# Patient Record
Sex: MALE | Race: OTHER | Employment: UNEMPLOYED | URBAN - METROPOLITAN AREA
[De-identification: names, ages, dates, MRNs, and addresses within clinical notes are randomized per-mention and may not be internally consistent; named-entity substitution may affect disease eponyms.]

---

## 2017-03-21 ENCOUNTER — OFFICE VISIT (OUTPATIENT)
Dept: FAMILY MEDICINE CLINIC | Age: 37
End: 2017-03-21

## 2017-03-21 VITALS
SYSTOLIC BLOOD PRESSURE: 128 MMHG | HEIGHT: 74 IN | BODY MASS INDEX: 34.01 KG/M2 | HEART RATE: 90 BPM | TEMPERATURE: 97.2 F | DIASTOLIC BLOOD PRESSURE: 69 MMHG | RESPIRATION RATE: 18 BRPM | WEIGHT: 265 LBS

## 2017-03-21 DIAGNOSIS — G56.03 BILATERAL CARPAL TUNNEL SYNDROME: Primary | ICD-10-CM

## 2017-03-21 DIAGNOSIS — Z51.81 MEDICATION MONITORING ENCOUNTER: ICD-10-CM

## 2017-03-21 DIAGNOSIS — M79.641 PAIN OF RIGHT HAND: ICD-10-CM

## 2017-03-21 LAB
ERYTHROCYTE [DISTWIDTH] IN BLOOD BY AUTOMATED COUNT: 13.6 % (ref 10–16)
HCT VFR BLD AUTO: 48.7 % (ref 36.6–51.9)
HGB BLD-MCNC: 16.6 G/DL (ref 13.2–17.3)
MCH RBC QN AUTO: 32 PG (ref 26–34)
MCHC RBC AUTO-ENTMCNC: 34 G/DL (ref 32–36)
MCV RBC AUTO: 93 FL (ref 80–95)
PLATELET # BLD AUTO: 292 K/UL (ref 140–440)
PMV BLD AUTO: 9.7 FL (ref 6–10.8)
RBC # BLD AUTO: 5.26 M/UL (ref 3.8–5.8)
WBC # BLD AUTO: 8.2 K/UL (ref 4–11)

## 2017-03-21 NOTE — PROGRESS NOTES
Ann Marie Bermudez is a 44 y.o. male and presents with Hand Pain (Bilateral ); Hand Swelling (wrist); and Numbness (hand)       Subjective:    Bilateral carpal tunnel syndrome- continues to be a problem for him. Difficulty holding tools. Still very busy at work on floors and on his cars. Every morning wakes with hand feeling numb. Both hands. Using motrin this week- no change. Used it in the past.   Also pain at base of right 1st mcp joint    Assessment/Plan:      Bilateral carpal tunnel based on sx. Neg tinnel but positive phalen - referral to hand surgery. Resume using wrist brace at night until complete resolution discussed. Continue motrin- check labs. Right hand pain- check xray to r/o DJD  [place]- on some nails- normal hgb 7/2016 so unlikely iron deficiency- will recheck CBC. The patient was advised that NSAID-type medications have two very important potential side effects: gastrointestinal irritation including hemorrhage and renal injuries. He was asked to take the medication with food and to stop if he experiences any GI upset. I asked him to call for vomiting, abdominal pain or black/bloody stools. The patient expresses understanding of these issues and questions were answered. RTC in 4 weeks. Orders Placed This Encounter    XR HAND RT MIN 3 V     Standing Status:   Future     Number of Occurrences:   1     Standing Expiration Date:   4/21/2018     Order Specific Question:   Reason for Exam     Answer:   pain at 1st MCP     Order Specific Question:   Is Patient Allergic to Contrast Dye? Answer:   No    METABOLIC PANEL, BASIC     Standing Status:   Future     Standing Expiration Date:   3/22/2018    CBC W/O DIFF     Standing Status:   Future     Standing Expiration Date:   3/22/2018    ibuprofen 100 mg tablet     Sig: Take 100 mg by mouth every six (6) hours as needed for Pain.            ROS:  Negative except as mentioned above  Cardiac- no chest pain or palpitations  Pulmonary- no sob or wheezes  GI- no n/v or diarrhea. SH:  Social History   Substance Use Topics    Smoking status: Current Every Day Smoker    Smokeless tobacco: None    Alcohol use 0.0 oz/week     0 Standard drinks or equivalent per week      Comment: marijuana         Medications/Allergies:  No current outpatient prescriptions on file prior to visit. No current facility-administered medications on file prior to visit. No Known Allergies    Objective:  Visit Vitals    /69    Pulse 90    Temp 97.2 °F (36.2 °C) (Oral)    Resp 18    Ht 6' 2\" (1.88 m)    Wt 265 lb (120.2 kg)    BMI 34.02 kg/m2    Body mass index is 34.02 kg/(m^2). Constitutional: Well developed, nourished, no distress, alert   Hands- Bilateral small trauma- shallow lac/abrasion. + phalen sign but neg tinnel's. Xray reviewed- possible mild narrowing of 1st MCP.

## 2017-03-21 NOTE — PROGRESS NOTES
1. Have you been to the ER, urgent care clinic since your last visit? Hospitalized since your last visit? No.     2. Have you seen or consulted any other health care providers outside of the [facility] since your last visit? Include any pap smears or colon screening. No.    Patient presents with bilateral hand pain, swelling and weakness/numbness. And a shooting left hand radiates up to his left shoulder.

## 2017-03-21 NOTE — PATIENT INSTRUCTIONS
Carpal Tunnel Syndrome: Exercises  Your Care Instructions  Here are some examples of typical rehabilitation exercises for your condition. Start each exercise slowly. Ease off the exercise if you start to have pain. Your doctor or your physical or occupational therapist will tell you when you can start these exercises and which ones will work best for you. How to do the exercises  Note: When you no longer have pain or numbness, you can do exercises to help prevent carpal tunnel syndrome from coming back. Do not do any stretch or movement that is uncomfortable or painful. Warm-up stretches  1. Rotate your wrist up, down, and from side to side. Repeat 4 times. 2. Stretch your fingers far apart. Relax them, and then stretch them again. Repeat 4 times. 3. Stretch your thumb by pulling it back gently, holding it, and then releasing it. Repeat 4 times. Prayer stretch    1. Start with your palms together in front of your chest just below your chin. 2. Slowly lower your hands toward your waistline, keeping your hands close to your stomach and your palms together until you feel a mild to moderate stretch under your forearms. 3. Hold for at least 15 to 30 seconds. Repeat 2 to 4 times. Wrist flexor stretch    1. Extend your arm in front of you with your palm up. 2. Bend your wrist, pointing your hand toward the floor. 3. With your other hand, gently bend your wrist farther until you feel a mild to moderate stretch in your forearm. 4. Hold for at least 15 to 30 seconds. Repeat 2 to 4 times. Wrist extensor stretch    Repeat steps 1 through 4 of the stretch above, but begin with your extended hand palm down. Follow-up care is a key part of your treatment and safety. Be sure to make and go to all appointments, and call your doctor if you are having problems. It's also a good idea to know your test results and keep a list of the medicines you take. Where can you learn more?   Go to http://seema-jaymie.info/. Enter G141 in the search box to learn more about \"Carpal Tunnel Syndrome: Exercises. \"  Current as of: May 23, 2016  Content Version: 11.1  © 4467-4608 [employer]. Care instructions adapted under license by [employer] (which disclaims liability or warranty for this information). If you have questions about a medical condition or this instruction, always ask your healthcare professional. [place] [address] any warranty or liability for your use of this information. Carpal Tunnel Syndrome: Care Instructions  Your Care Instructions    Carpal tunnel syndrome is a nerve problem. It can cause tingling, numbness, weakness, or pain in the fingers, thumb, and hand. The median nerve and several tough tissues called tendons run through a space in the wrist called the carpal tunnel. The repeated hand motions used in work and some hobbies and sports can put pressure on the nerve. Pregnancy and several conditions, including diabetes, arthritis, and an underactive thyroid, also can cause carpal tunnel syndrome. You may be able to limit an activity or do it differently to reduce your symptoms. You also can take other steps to feel better. If your symptoms are mild, 1 to 2 weeks of home treatment are likely to ease your pain. Surgery is needed only if other treatments do not work. Follow-up care is a key part of your treatment and safety. Be sure to make and go to all appointments, and call your doctor if you are having problems. It's also a good idea to know your test results and keep a list of the medicines you take. How can you care for yourself at home? · If possible, stop or reduce the activity that causes your symptoms. If you cannot stop the activity, take frequent breaks to rest and stretch or change hand positions to do a task. Try switching hands, such as when using a computer mouse.   · Try to avoid bending or twisting your wrists. · Ask your doctor if you can take an over-the-counter pain medicine, such as acetaminophen (Tylenol), ibuprofen (Advil, Motrin), or naproxen (Aleve). Be safe with medicines. Read and follow all instructions on the label. · If your doctor prescribes corticosteroid medicine to help reduce pain and swelling, take it exactly as prescribed. Call your doctor if you think you are having a problem with your medicine. · Put ice or a cold pack on your wrist for 10 to 20 minutes at a time to ease pain. Put a thin cloth between the ice and your skin. · If your doctor or your physical or occupational therapist tells you to wear a wrist splint, wear it as directed to keep your wrist in a neutral position. This also eases pressure on your median nerve. · Ask your doctor whether you should have physical or occupational therapy to learn how to do tasks differently. · Try a yoga class to stretch your muscles and build strength in your hands and wrists. Yoga has been shown to ease carpal tunnel symptoms. To prevent carpal tunnel  · When working at a computer, keep your hands and wrists in line with your forearms. Hold your elbows close to your sides. Take a break every 10 to 15 minutes. · Try these exercises:  ¨ Warm up: Rotate your wrist up, down, and from side to side. Repeat this 4 times. Stretch your fingers far apart, relax them, then stretch them again. Repeat 4 times. Stretch your thumb by pulling it back gently, holding it, and then releasing it. Repeat 4 times. ¨ Prayer stretch: Start with your palms together in front of your chest just below your chin. Slowly lower your hands toward your waistline while keeping your hands close to your stomach and your palms together until you feel a mild to moderate stretch under your forearms. Hold for 10 to 20 seconds. Repeat 4 times. ¨ Wrist flexor stretch: Hold your arm in front of you with your palm up. Bend your wrist, pointing your hand toward the floor.  With your other hand, gently bend your wrist further until you feel a mild to moderate stretch in your forearm. Hold for 10 to 20 seconds. Repeat 4 times. ¨ Wrist extensor stretch: Repeat the steps for the wrist flexor stretch, but begin with your extended hand palm down. · Squeeze a rubber exercise ball several times a day to keep your hands and fingers strong. · Avoid holding objects (such as a book) in one position for a long time. When possible, use your whole hand to grasp an object. Using just the thumb and index finger can put stress on the wrist.  · Do not smoke. It can make this condition worse by reducing blood flow to the median nerve. If you need help quitting, talk to your doctor about stop-smoking programs and medicines. These can increase your chances of quitting for good. When should you call for help? Watch closely for changes in your health, and be sure to contact your doctor if:  · Your pain or other problems do not get better with home care. · You want more information about physical or occupational therapy. · You have side effects of your corticosteroid medicine, such as:  ¨ Weight gain. ¨ Mood changes. ¨ Trouble sleeping. ¨ Bruising easily. · You have any other problems with your medicine. Where can you learn more? Go to http://seema-jaymie.info/. Enter R432 in the search box to learn more about \"Carpal Tunnel Syndrome: Care Instructions. \"  Current as of: May 23, 2016  Content Version: 11.1  © 6887-6924 [employer]. Care instructions adapted under license by [employer] (which disclaims liability or warranty for this information). If you have questions about a medical condition or this instruction, always ask your healthcare professional. [place] [address] any warranty or liability for your use of this information.

## 2017-03-21 NOTE — MR AVS SNAPSHOT
Visit Information Date & Time Provider Department Dept. Phone Encounter #  
 3/21/2017  9:00 AM Emi De Los Santos, [address] [place] [address] [phone] [id number] Follow-up Instructions Return in about 4 weeks (around 4/18/2017). Upcoming Health Maintenance Date Due Pneumococcal 19-64 Medium Risk (1 of 1 - PPSV23) 2/21/1998 DTaP/Tdap/Td series (1 - Tdap) 2/21/2000 INFLUENZA AGE 9 TO ADULT 8/1/2016 Allergies as of 3/21/2017  Review Complete On: 3/21/2017 By: Emi De Los Santos MD  
 No Known Allergies Current Immunizations  Never Reviewed No immunizations on file. Not reviewed this visit You Were Diagnosed With   
  
 Codes Comments Bilateral carpal tunnel syndrome    -  Primary ICD-10-CM: G56.03 
ICD-9-CM: 354.0 Pain of right hand     ICD-10-CM: M79.641 ICD-9-CM: 729.5 1st mcp Medication monitoring encounter     ICD-10-CM: Z51.81 
ICD-9-CM: V58.83 Vitals BP Pulse Temp Resp Height(growth percentile) Weight(growth percentile) 128/69 90 97.2 °F (36.2 °C) (Oral) 18 6' 2\" (1.88 m) 265 lb (120.2 kg) BMI Smoking Status 34.02 kg/m2 Current Every Day Smoker BMI and BSA Data Body Mass Index Body Surface Area 34.02 kg/m 2 2.51 m 2 Preferred Pharmacy Pharmacy Name Phone [address] [phone] Your Updated Medication List  
  
   
This list is accurate as of: 3/21/17  9:55 AM.  Always use your most recent med list.  
  
  
  
  
 ibuprofen 100 mg tablet Take 100 mg by mouth every six (6) hours as needed for Pain. We Performed the Following REFERRAL TO HAND SURGERY [REF31 Custom] Comments:  
 Please evaluate patient for CTS Follow-up Instructions Return in about 4 weeks (around 4/18/2017). To-Do List   
 03/21/2017 Lab:  CBC W/O DIFF   
  
 03/21/2017 Lab: METABOLIC PANEL, BASIC   
  
 03/21/2017 Imaging:  XR HAND RT MIN 3 V Referral Information Referral ID Referred By Referred To  
  
 [id number] BILLY Montemayor Not Available Visits Status Start Date End Date 1 New Request 3/21/17 3/21/18 If your referral has a status of pending review or denied, additional information will be sent to support the outcome of this decision. Patient Instructions Carpal Tunnel Syndrome: Exercises Your Care Instructions Here are some examples of typical rehabilitation exercises for your condition. Start each exercise slowly. Ease off the exercise if you start to have pain. Your doctor or your physical or occupational therapist will tell you when you can start these exercises and which ones will work best for you. How to do the exercises Note: When you no longer have pain or numbness, you can do exercises to help prevent carpal tunnel syndrome from coming back. Do not do any stretch or movement that is uncomfortable or painful. Warm-up stretches 1. Rotate your wrist up, down, and from side to side. Repeat 4 times. 2. Stretch your fingers far apart. Relax them, and then stretch them again. Repeat 4 times. 3. Stretch your thumb by pulling it back gently, holding it, and then releasing it. Repeat 4 times. Prayer stretch 1. Start with your palms together in front of your chest just below your chin. 2. Slowly lower your hands toward your waistline, keeping your hands close to your stomach and your palms together until you feel a mild to moderate stretch under your forearms. 3. Hold for at least 15 to 30 seconds. Repeat 2 to 4 times. Wrist flexor stretch 1. Extend your arm in front of you with your palm up. 2. Bend your wrist, pointing your hand toward the floor. 3. With your other hand, gently bend your wrist farther until you feel a mild to moderate stretch in your forearm. 4. Hold for at least 15 to 30 seconds. Repeat 2 to 4 times. Wrist extensor stretch Repeat steps 1 through 4 of the stretch above, but begin with your extended hand palm down. Follow-up care is a key part of your treatment and safety. Be sure to make and go to all appointments, and call your doctor if you are having problems. It's also a good idea to know your test results and keep a list of the medicines you take. Where can you learn more? Go to http://seema-jaymie.info/. Enter O568 in the search box to learn more about \"Carpal Tunnel Syndrome: Exercises. \" Current as of: May 23, 2016 Content Version: 11.1 © 3359-1800 [employer]unch[employer]. Care instructions adapted under license by [employer] (which disclaims liability or warranty for this information). If you have questions about a medical condition or this instruction, always ask your healthcare professional. [place] [address] any warranty or liability for your use of this information. Carpal Tunnel Syndrome: Care Instructions Your Care Instructions Carpal tunnel syndrome is a nerve problem. It can cause tingling, numbness, weakness, or pain in the fingers, thumb, and hand. The median nerve and several tough tissues called tendons run through a space in the wrist called the carpal tunnel. The repeated hand motions used in work and some hobbies and sports can put pressure on the nerve. Pregnancy and several conditions, including diabetes, arthritis, and an underactive thyroid, also can cause carpal tunnel syndrome. You may be able to limit an activity or do it differently to reduce your symptoms. You also can take other steps to feel better. If your symptoms are mild, 1 to 2 weeks of home treatment are likely to ease your pain. Surgery is needed only if other treatments do not work. Follow-up care is a key part of your treatment and safety. Be sure to make and go to all appointments, and call your doctor if you are having problems. It's also a good idea to know your test results and keep a list of the medicines you take. How can you care for yourself at home? · If possible, stop or reduce the activity that causes your symptoms. If you cannot stop the activity, take frequent breaks to rest and stretch or change hand positions to do a task. Try switching hands, such as when using a computer mouse. · Try to avoid bending or twisting your wrists. · Ask your doctor if you can take an over-the-counter pain medicine, such as acetaminophen (Tylenol), ibuprofen (Advil, Motrin), or naproxen (Aleve). Be safe with medicines. Read and follow all instructions on the label. · If your doctor prescribes corticosteroid medicine to help reduce pain and swelling, take it exactly as prescribed. Call your doctor if you think you are having a problem with your medicine. · Put ice or a cold pack on your wrist for 10 to 20 minutes at a time to ease pain. Put a thin cloth between the ice and your skin. · If your doctor or your physical or occupational therapist tells you to wear a wrist splint, wear it as directed to keep your wrist in a neutral position. This also eases pressure on your median nerve. · Ask your doctor whether you should have physical or occupational therapy to learn how to do tasks differently. · Try a yoga class to stretch your muscles and build strength in your hands and wrists. Yoga has been shown to ease carpal tunnel symptoms. To prevent carpal tunnel · When working at a computer, keep your hands and wrists in line with your forearms. Hold your elbows close to your sides. Take a break every 10 to 15 minutes. · Try these exercises: ¨ Warm up: Rotate your wrist up, down, and from side to side. Repeat this 4 times. Stretch your fingers far apart, relax them, then stretch them again. Repeat 4 times. Stretch your thumb by pulling it back gently, holding it, and then releasing it. Repeat 4 times. ¨ Prayer stretch: Start with your palms together in front of your chest just below your chin. Slowly lower your hands toward your waistline while keeping your hands close to your stomach and your palms together until you feel a mild to moderate stretch under your forearms. Hold for 10 to 20 seconds. Repeat 4 times. ¨ Wrist flexor stretch: Hold your arm in front of you with your palm up. Bend your wrist, pointing your hand toward the floor. With your other hand, gently bend your wrist further until you feel a mild to moderate stretch in your forearm. Hold for 10 to 20 seconds. Repeat 4 times. ¨ Wrist extensor stretch: Repeat the steps for the wrist flexor stretch, but begin with your extended hand palm down. · Squeeze a rubber exercise ball several times a day to keep your hands and fingers strong. · Avoid holding objects (such as a book) in one position for a long time. When possible, use your whole hand to grasp an object. Using just the thumb and index finger can put stress on the wrist. 
· Do not smoke. It can make this condition worse by reducing blood flow to the median nerve. If you need help quitting, talk to your doctor about stop-smoking programs and medicines. These can increase your chances of quitting for good. When should you call for help? Watch closely for changes in your health, and be sure to contact your doctor if: 
· Your pain or other problems do not get better with home care. · You want more information about physical or occupational therapy. · You have side effects of your corticosteroid medicine, such as: 
¨ Weight gain. ¨ Mood changes. ¨ Trouble sleeping. ¨ Bruising easily. · You have any other problems with your medicine. Where can you learn more? Go to http://seema-jaymie.info/. Enter R432 in the search box to learn more about \"Carpal Tunnel Syndrome: Care Instructions. \" Current as of: May 23, 2016 Content Version: 11.1 © 5320-5987 [employer] [facility]. Care instructions adapted under license by [employer] (which disclaims liability or warranty for this information). If you have questions about a medical condition or this instruction, always ask your healthcare professional. [address] any warranty or liability for your use of this information. Introducing [facility]! Mona Singletary introduces Superbac patient portal. Now you can access parts of your medical record, email your doctor's office, and request medication refills online. 1. In your internet browser, go to [email or web address] Internet. Stand Offer/AT Internet 2. Click on the First Time User? Click Here link in the Sign In box. You will see the New Member Sign Up page. 3. Enter your Superbac Access Code exactly as it appears below. You will not need to use this code after youve completed the sign-up process. If you do not sign up before the expiration date, you must request a new code. · Superbac Access Code: [email or web address] Expires: 6/19/2017  9:55 AM 
 
4. Enter the last four digits of your Social Security Number (xxxx) and Date of Birth (mm/dd/yyyy) as indicated and click Submit. You will be taken to the next sign-up page. 5. Create a ActionRunt ID. This will be your Superbac login ID and cannot be changed, so think of one that is secure and easy to remember. 6. Create a Superbac password. You can change your password at any time. 7. Enter your Password Reset Question and Answer. This can be used at a later time if you forget your password. 8. Enter your e-mail address. You will receive e-mail notification when new information is available in [address] 9. Click Sign Up. You can now view and download portions of your medical record. 10. Click the Download Summary menu link to download a portable copy of your medical information. If you have questions, please visit the Frequently Asked Questions section of the Motion Math website. Remember, Motion Math is NOT to be used for urgent needs. For medical emergencies, dial 911. Now available from your iPhone and Android! Please provide this summary of care documentation to your next provider. Your primary care clinician is listed as MANE Soliman. If you have any questions after today's visit, please call [phone].

## 2017-03-22 LAB
ANION GAP SERPL CALC-SCNC: 19 MMOL/L
BUN SERPL-MCNC: 14 MG/DL (ref 6–22)
CALCIUM SERPL-MCNC: 9.8 MG/DL (ref 8.4–10.4)
CHLORIDE SERPL-SCNC: 99 MMOL/L (ref 98–110)
CO2 SERPL-SCNC: 25 MMOL/L (ref 20–32)
CREAT SERPL-MCNC: 0.9 MG/DL (ref 0.5–1.2)
GFRAA, 66117: >60
GFRNA, 66118: >60
GLUCOSE SERPL-MCNC: 132 MG/DL (ref 65–99)
POTASSIUM SERPL-SCNC: 4.3 MMOL/L (ref 3.5–5.5)
SODIUM SERPL-SCNC: 143 MMOL/L (ref 133–145)

## 2017-05-16 ENCOUNTER — OFFICE VISIT (OUTPATIENT)
Dept: FAMILY MEDICINE CLINIC | Age: 37
End: 2017-05-16

## 2017-05-16 VITALS
HEART RATE: 96 BPM | OXYGEN SATURATION: 96 % | HEIGHT: 74 IN | TEMPERATURE: 98.4 F | DIASTOLIC BLOOD PRESSURE: 51 MMHG | RESPIRATION RATE: 18 BRPM | SYSTOLIC BLOOD PRESSURE: 144 MMHG | BODY MASS INDEX: 33.62 KG/M2 | WEIGHT: 262 LBS

## 2017-05-16 DIAGNOSIS — H92.01 EAR PAIN, RIGHT: Primary | ICD-10-CM

## 2017-05-16 NOTE — PROGRESS NOTES
1. Have you been to the ER, urgent care clinic since your last visit? Hospitalized since your last visit? No    2. Have you seen or consulted any other health care providers outside of the [address] [place] [address] since your last visit? Include any pap smears or colon screening.  No

## 2017-05-16 NOTE — PATIENT INSTRUCTIONS
Earache: Care Instructions  Your Care Instructions  Even though infection is a common cause of ear pain, not all ear pain means an infection. If you have ear pain and don't have an infection, it could be because of a jaw problem, such as temporomandibular joint (TMJ) pain. Or it could be because of a neck problem. When ear discomfort or pain is mild or comes and goes without other symptoms, home treatment may be all you need. Follow-up care is a key part of your treatment and safety. Be sure to make and go to all appointments, and call your doctor if you are having problems. It's also a good idea to know your test results and keep a list of the medicines you take. How can you care for yourself at home? · Apply heat on the ear to ease pain. To apply heat, put a warm water bottle, a heating pad set on low, or a warm cloth on your ear. Do not go to sleep with a heating pad on your skin. · Take an over-the-counter pain medicine, such as acetaminophen (Tylenol), ibuprofen (Advil, Motrin), or naproxen (Aleve). Be safe with medicines. Read and follow all instructions on the label. · Do not take two or more pain medicines at the same time unless the doctor told you to. Many pain medicines have acetaminophen, which is Tylenol. Too much acetaminophen (Tylenol) can be harmful. · Never insert anything, such as a cotton swab or a trev pin, into the ear. When should you call for help? Call your doctor now or seek immediate medical care if:  · You have a fever. · You feel a lump in your neck or jaw. · The area around the ear starts to swell. · There is pus or blood draining from the ear. · There is new or different drainage from the ear. Watch closely for changes in your health, and be sure to contact your doctor if:  · Your pain gets worse. · You do not get better as expected. Where can you learn more? Go to http://seema-jaymie.info/.   Enter O970 in the search box to learn more about \"Earache: Care Instructions. \"  Current as of: July 29, 2016  Content Version: 11.2  © 8494-5401 [employer], [employer]. Care instructions adapted under license by [employer] (which disclaims liability or warranty for this information). If you have questions about a medical condition or this instruction, always ask your healthcare professional. [place] [address] any warranty or liability for your use of this information.

## 2017-05-16 NOTE — PROGRESS NOTES
Chief Complaint   Patient presents with    Ear Pain     HPI:    This is a 43 y/o male who presents with the above symptom. Symptoms started with cough and sore throat that have since resolved. Today he complain of right ear pain. Patient denies SOB, CP, dizziness, headache. ROS: pertinent positives as noted in HPI. All others were negative      No past medical history on file. Social History     Social History    Marital status:      Spouse name: N/A    Number of children: N/A    Years of education: N/A     Occupational History    Not on file. Social History Main Topics    Smoking status: Current Every Day Smoker    Smokeless tobacco: Not on file      Comment: smokes e cig     Alcohol use No    Drug use: No    Sexual activity: Yes     Partners: Female     Birth control/ protection: None     Other Topics Concern    Not on file     Social History Narrative     Current Outpatient Prescriptions   Medication Sig Dispense Refill    ibuprofen 100 mg tablet Take 100 mg by mouth every six (6) hours as needed for Pain. No Known Allergies    Physical Exam:    Vital Signs:     Visit Vitals    /51    Pulse 96    Temp 98.4 °F (36.9 °C) (Oral)    Resp 18    Ht 6' 2\" (1.88 m)    Wt 262 lb (118.8 kg)    SpO2 96%    BMI 33.64 kg/m2         General: a, a & o x 3, afebrile,  interacting appropriately, in no acute distress  HEENT: head NC/AT, conjuctiva clear, nose clear, ear canals clear, TM intact  and clear. No erythema   Respiratory: lung sounds clear bilaterally,  no wheezes or crackles  Cardiovascular: normal S1S2, regular rate and rhythm, no murmurs      Assessment/Plan:      ICD-10-CM ICD-9-CM    1. Ear pain, right H92.01 388.70 Patient advised to take Tylenol for pain  Patient to apply warm compresses for comfort           Additional Notes: Discussed today's diagnosis, treatment plans. Discussed medication indications and side effects.     After Visit Summary: Provided and discussed printed patient instructions. Answered questions in relation to today's diagnosis.   Follow-up Disposition: taran Schulz NP-BC  [address] [facility],[address]

## 2017-05-16 NOTE — MR AVS SNAPSHOT
Visit Information Date & Time Provider Department Dept. Phone Encounter #  
 5/16/2017  2:30 PM Allegra San NP Jeremy 13 [id number] Follow-up Instructions Return if symptoms worsen or fail to improve. Your Appointments 5/23/2017  8:30 AM  
PHYSICAL with MD Comfort Herrera 23 [facility]) Appt Note: CPE  
 [address]  
  
   
 [address] Upcoming Health Maintenance Date Due Pneumococcal 19-64 Medium Risk (1 of 1 - PPSV23) 2/21/1998 DTaP/Tdap/Td series (1 - Tdap) 2/21/2000 INFLUENZA AGE 9 TO ADULT 8/1/2017 Allergies as of 5/16/2017  Review Complete On: 5/16/2017 By: Thuy Banks LPN No Known Allergies Current Immunizations  Never Reviewed No immunizations on file. Not reviewed this visit You Were Diagnosed With   
  
 Codes Comments Ear pain, right    -  Primary ICD-10-CM: H92.01 
ICD-9-CM: 388.70 Vitals BP Pulse Temp Resp Height(growth percentile) Weight(growth percentile) 144/51 96 98.4 °F (36.9 °C) (Oral) 18 6' 2\" (1.88 m) 262 lb (118.8 kg) SpO2 BMI Smoking Status 96% 33.64 kg/m2 Current Every Day Smoker BMI and BSA Data Body Mass Index Body Surface Area  
 33.64 kg/m 2 2.49 m 2 Preferred Pharmacy Pharmacy Name Phone [address] [phone] Your Updated Medication List  
  
   
This list is accurate as of: 5/16/17  3:05 PM.  Always use your most recent med list.  
  
  
  
  
 ibuprofen 100 mg tablet Take 100 mg by mouth every six (6) hours as needed for Pain. Follow-up Instructions Return if symptoms worsen or fail to improve. Patient Instructions Earache: Care Instructions Your Care Instructions Even though infection is a common cause of ear pain, not all ear pain means an infection. If you have ear pain and don't have an infection, it could be because of a jaw problem, such as temporomandibular joint (TMJ) pain. Or it could be because of a neck problem. When ear discomfort or pain is mild or comes and goes without other symptoms, home treatment may be all you need. Follow-up care is a key part of your treatment and safety. Be sure to make and go to all appointments, and call your doctor if you are having problems. It's also a good idea to know your test results and keep a list of the medicines you take. How can you care for yourself at home? · Apply heat on the ear to ease pain. To apply heat, put a warm water bottle, a heating pad set on low, or a warm cloth on your ear. Do not go to sleep with a heating pad on your skin. · Take an over-the-counter pain medicine, such as acetaminophen (Tylenol), ibuprofen (Advil, Motrin), or naproxen (Aleve). Be safe with medicines. Read and follow all instructions on the label. · Do not take two or more pain medicines at the same time unless the doctor told you to. Many pain medicines have acetaminophen, which is Tylenol. Too much acetaminophen (Tylenol) can be harmful. · Never insert anything, such as a cotton swab or a trev pin, into the ear. When should you call for help? Call your doctor now or seek immediate medical care if: 
· You have a fever. · You feel a lump in your neck or jaw. · The area around the ear starts to swell. · There is pus or blood draining from the ear. · There is new or different drainage from the ear. Watch closely for changes in your health, and be sure to contact your doctor if: 
· Your pain gets worse. · You do not get better as expected. Where can you learn more? Go to http://seema-jaymie.info/. Enter H434 in the search box to learn more about \"Earache: Care Instructions. \" 
 Current as of: July 29, 2016 Content Version: 11.2 © 6220-6884 [employer]. Care instructions adapted under license by [employer] (which disclaims liability or warranty for this information). If you have questions about a medical condition or this instruction, always ask your healthcare professional. [address] any warranty or liability for your use of this information. Introducing [facility]! Tori Guzman introduces [facility] patient portal. Now you can access parts of your medical record, email your doctor's office, and request medication refills online. 1. In your internet browser, go to [email or web address]/Kickstarter 2. Click on the First Time User? Click Here link in the Sign In box. You will see the New Member Sign Up page. 3. Enter your [facility] Access Code exactly as it appears below. You will not need to use this code after youve completed the sign-up process. If you do not sign up before the expiration date, you must request a new code. · [employer] [facility] Access Code: [email or web address] Expires: 6/19/2017  9:55 AM 
 
4. Enter the last four digits of your Social Security Number (xxxx) and Date of Birth (mm/dd/yyyy) as indicated and click Submit. You will be taken to the next sign-up page. 5. Create a [facility] ID. This will be your [facility] login ID and cannot be changed, so think of one that is secure and easy to remember. 6. Create a [facility] password. You can change your password at any time. 7. Enter your Password Reset Question and Answer. This can be used at a later time if you forget your password. 8. Enter your e-mail address. You will receive e-mail notification when new information is available in [address] 9. Click Sign Up. You can now view and download portions of your medical record. 10. Click the Download Summary menu link to download a portable copy of your medical information. If you have questions, please visit the Frequently Asked Questions section of the [employer] website. Remember, [employer] is NOT to be used for urgent needs. For medical emergencies, dial 911. Now available from your iPhone and Android! Please provide this summary of care documentation to your next provider. Your primary care clinician is listed as MANE Soliman. If you have any questions after today's visit, please call [phone].

## 2017-05-23 ENCOUNTER — OFFICE VISIT (OUTPATIENT)
Dept: FAMILY MEDICINE CLINIC | Age: 37
End: 2017-05-23

## 2017-05-23 VITALS
SYSTOLIC BLOOD PRESSURE: 130 MMHG | BODY MASS INDEX: 34.01 KG/M2 | RESPIRATION RATE: 16 BRPM | DIASTOLIC BLOOD PRESSURE: 60 MMHG | HEIGHT: 74 IN | WEIGHT: 265 LBS | TEMPERATURE: 97.8 F | HEART RATE: 79 BPM

## 2017-05-23 DIAGNOSIS — R73.9 ELEVATED BLOOD SUGAR: ICD-10-CM

## 2017-05-23 DIAGNOSIS — G56.03 BILATERAL CARPAL TUNNEL SYNDROME: Primary | ICD-10-CM

## 2017-05-23 DIAGNOSIS — Z72.0 TOBACCO ABUSE: ICD-10-CM

## 2017-05-23 DIAGNOSIS — R74.01 TRANSAMINITIS: ICD-10-CM

## 2017-05-23 DIAGNOSIS — R60.0 EDEMA OF LEFT LOWER EXTREMITY: ICD-10-CM

## 2017-05-23 LAB
A-G RATIO,AGRAT: 1.7 RATIO (ref 1.1–2.6)
ABSOLUTE LYMPHOCYTE COUNT, 10803: 2.4 K/UL (ref 1–4.8)
ALBUMIN SERPL-MCNC: 4.3 G/DL (ref 3.5–5)
ALP SERPL-CCNC: 66 U/L (ref 25–115)
ALT SERPL-CCNC: 46 U/L (ref 5–40)
AST SERPL W P-5'-P-CCNC: 29 U/L (ref 10–37)
BASOPHILS # BLD: 0 K/UL (ref 0–0.2)
BASOPHILS NFR BLD: 0 % (ref 0–2)
BILIRUB SERPL-MCNC: 0.4 MG/DL (ref 0.2–1.2)
BILIRUB UR QL: NEGATIVE
BILIRUBIN, DIRECT,CBIL: <0.2 MG/DL (ref 0–0.3)
EOSINOPHIL # BLD: 0.2 K/UL (ref 0–0.5)
EOSINOPHIL NFR BLD: 2 % (ref 0–6)
EPITHELIAL,EPSU: ABNORMAL /HPF (ref 0–2)
ERYTHROCYTE [DISTWIDTH] IN BLOOD BY AUTOMATED COUNT: 13.9 % (ref 10–16)
GLOBULIN,GLOB: 2.6 G/DL (ref 2–4)
GLUCOSE UR QL: NEGATIVE MG/DL
GRANULOCYTES,GRANS: 64 % (ref 40–75)
HCT VFR BLD AUTO: 44.6 % (ref 36.6–51.9)
HGB BLD-MCNC: 14.9 G/DL (ref 13.2–17.3)
HGB UR QL STRIP: NEGATIVE
KETONES UR QL STRIP.AUTO: NEGATIVE MG/DL
LEUKOCYTE ESTERASE: NEGATIVE
LYMPHOCYTES, LYMLT: 26 % (ref 27–45)
MCH RBC QN AUTO: 30 PG (ref 26–34)
MCHC RBC AUTO-ENTMCNC: 33 G/DL (ref 32–36)
MCV RBC AUTO: 90 FL (ref 80–95)
MONOCYTES # BLD: 0.7 K/UL (ref 0.1–0.9)
MONOCYTES NFR BLD: 8 % (ref 3–9)
NEUTROPHILS # BLD AUTO: 6 K/UL (ref 1.8–7.7)
NITRITE UR QL STRIP.AUTO: NEGATIVE
PH UR STRIP: 6 PH (ref 5–8)
PLATELET # BLD AUTO: 318 K/UL (ref 140–440)
PMV BLD AUTO: 9.3 FL (ref 6–10.8)
PROT SERPL-MCNC: 6.9 G/DL (ref 6.4–8.3)
PROT UR QL STRIP: NEGATIVE MG/DL
RBC # BLD AUTO: 4.94 M/UL (ref 3.8–5.8)
RBC #/AREA URNS HPF: NEGATIVE /HPF
SP GR UR: 1.02 (ref 1–1.03)
UROBILINOGEN UR STRIP-MCNC: <2 MG/DL
WBC # BLD AUTO: 9.3 K/UL (ref 4–11)
WBC URNS QL MICRO: ABNORMAL /HPF (ref 0–2)

## 2017-05-23 NOTE — PATIENT INSTRUCTIONS
Leg and Ankle Edema: Care Instructions  Your Care Instructions  Swelling in the legs, ankles, and feet is called edema. It is common after you sit or stand for a while. Long plane flights or car rides often cause swelling in the legs and feet. You may also have swelling if you have to stand for long periods of time at your job. Problems with the veins in the legs (varicose veins) and changes in hormones can also cause swelling. Sometimes the swelling in the ankles and feet is caused by a more serious problem, such as heart failure, infection, blood clots, or liver or kidney disease. Follow-up care is a key part of your treatment and safety. Be sure to make and go to all appointments, and call your doctor if you are having problems. Its also a good idea to know your test results and keep a list of the medicines you take. How can you care for yourself at home? · If your doctor gave you medicine, take it as prescribed. Call your doctor if you think you are having a problem with your medicine. · Whenever you are resting, raise your legs up. Try to keep the swollen area higher than the level of your heart. · Take breaks from standing or sitting in one position. ¨ Walk around to increase the blood flow in your lower legs. ¨ Move your feet and ankles often while you stand, or tighten and relax your leg muscles. · Wear support stockings. Put them on in the morning, before swelling gets worse. · Eat a balanced diet. Lose weight if you need to. · Limit the amount of salt (sodium) in your diet. Salt holds fluid in the body and may increase swelling. When should you call for help? Call 911 anytime you think you may need emergency care. For example, call if:  · You have symptoms of a blood clot in your lung (called a pulmonary embolism). These may include:  ¨ Sudden chest pain. ¨ Trouble breathing. ¨ Coughing up blood.   Call your doctor now or seek immediate medical care if:  · You have signs of a blood clot, such as:  ¨ Pain in your calf, back of the knee, thigh, or groin. ¨ Redness and swelling in your leg or groin. · You have symptoms of infection, such as:  ¨ Increased pain, swelling, warmth, or redness. ¨ Red streaks or pus. ¨ A fever. Watch closely for changes in your health, and be sure to contact your doctor if:  · Your swelling is getting worse. · You have new or worsening pain in your legs. · You do not get better as expected. Where can you learn more? Go to http://seema-jaymie.info/. Enter E257 in the search box to learn more about \"Leg and Ankle Edema: Care Instructions. \"  Current as of: May 27, 2016  Content Version: 11.2  © 9677-8627 [employer]. Care instructions adapted under license by [employer] (which disclaims liability or warranty for this information). If you have questions about a medical condition or this instruction, always ask your healthcare professional. [place] [address] any warranty or liability for your use of this information. Deep Vein Thrombosis: Care Instructions  Your Care Instructions    A deep vein thrombosis (DVT) is a blood clot in certain veins of the legs, pelvis, or arms. Blood clots in these veins need to be treated because they can get bigger, break loose, and travel through the bloodstream to the lungs. A blood clot in a lung can be life-threatening. The doctor may have given you a blood thinner (anticoagulant). A blood thinner can stop the blood clot from growing larger and prevent new clots from forming. You will need to take a blood thinner for 3 to 6 months or longer. The doctor has checked you carefully, but problems can develop later. If you notice any problems or new symptoms, get medical treatment right away. Follow-up care is a key part of your treatment and safety. Be sure to make and go to all appointments, and call your doctor if you are having problems.  It's also a good idea to know your test results and keep a list of the medicines you take. How can you care for yourself at home? · Take your medicines exactly as prescribed. Call your doctor if you think you are having a problem with your medicine. · If you are taking a blood thinner, be sure you get instructions about how to take your medicine safely. Blood thinners can cause serious bleeding problems. · Wear compression stockings if your doctor recommends them. These stockings are tighter at the feet than on the legs. They may reduce pain and swelling in your legs. You can get them with a prescription at a medical supply store or at some drugstores. · When you sit, use a pillow to raise the arm or leg that has the blood clot. Try to keep it above the level of your heart. When should you call for help? Call 911 anytime you think you may need emergency care. For example, call if:  · You passed out (lost consciousness). · You have symptoms of a blood clot in your lung (called a pulmonary embolism). These include:  ¨ Sudden chest pain. ¨ Trouble breathing. ¨ Coughing up blood. Call your doctor now or seek immediate medical care if:  · You have new or worse trouble breathing. · You are dizzy or lightheaded, or you feel like you may faint. · You have symptoms of a blood clot in your arm or leg. These may include:  ¨ Pain in the arm, calf, back of the knee, thigh, or groin. ¨ Redness and swelling in the arm, leg, or groin. Watch closely for changes in your health, and be sure to contact your doctor if:  · You do not get better as expected. Where can you learn more? Go to http://seema-jaymie.info/. Enter M829 in the search box to learn more about \"Deep Vein Thrombosis: Care Instructions. \"  Current as of: October 13, 2016  Content Version: 11.2  © 5642-2709 [employer]. Care instructions adapted under license by [employer] (which disclaims liability or warranty for this information).  If you have questions about a medical condition or this instruction, always ask your healthcare professional. [address] any warranty or liability for your use of this information. Learning About Deep Vein Thrombosis  What is deep vein thrombosis? A deep vein thrombosis (DVT) is a blood clot in certain veins of the legs, pelvis, or arms. The clot is usually in the legs. DVT may damage the vein and cause the area to ache, swell, and change color. DVT also can lead to sores. DVT in these veins needs to be treated because the clots can get bigger, break loose, and travel through the bloodstream to the lungs. A blood clot in a lung can cause death. Blood clots can form in the veins when you are not active for a long period of time. For example, they can form if you need to stay in bed because of a health problem or must sit for a long time on an airplane or in a car. Surgery or an injury can damage your blood vessels and cause a clot to form. Cancer also can cause DVT. And some people have blood that clots too easily, which is a problem that may run in families. A risk factor is something that makes you more likely to develop a disease. Here are some major risk factors for DVT:  · You have surgery. · You have to stay in bed for more than 3 days (such as in the hospital). · Your blood is likely to clot because of an injury, cancer, or inherited condition. Here are some minor risk factors for DVT:  · You take birth control hormones. · You are pregnant. · You are in a car or airplane for a long trip. What are the symptoms? Symptoms of DVT may include:  · Swelling in the affected area. · Redness and warmth in the affected area. · Pain or tenderness. You may have pain only when you touch the affected area or when you stand or walk. If your doctor thinks you may have DVT, you will probably have an ultrasound test. You may have other tests as well. How can you prevent DVT?   · Exercise your lower leg muscles to help blood flow in your legs. Point your toes up toward your head so the calves of your legs are stretched, then relax and repeat. This is a good exercise to do when you are sitting for long periods of time. · Get out of bed as soon as you can after an illness or surgery. If you need to stay in bed, do the leg exercise noted above every hour when you are awake. · Use special stockings called compression stockings. These stockings are tight at the feet with a gradually looser fit on the leg. Many doctors recommend that you wear compression stockings during a journey longer than 8 hours. · Take breaks when you are on long trips. Stop the car and walk around. On long airplane flights, walk up and down the aisle hourly, flex and point your feet every 20 minutes while sitting, and drink plenty of water. · Take blood-thinning medicines after some types of surgery if your doctor recommends it. Blood thinners also may be used if you are likely to develop clots. How is DVT treated? Treatment for DVT usually involves taking blood thinners. These medicines are given through a vein (intravenously, or IV) or as a pill. Talk with your doctor about which medicine is right for you. Your doctor also may suggest that you prop up or elevate your leg when possible, take walks, and wear compression stockings. These measures may help reduce the pain and swelling that can happen with DVT. Follow-up care is a key part of your treatment and safety. Be sure to make and go to all appointments, and call your doctor if you are having problems. It's also a good idea to know your test results and keep a list of the medicines you take. Where can you learn more? Go to http://seema-jaymie.info/. Enter Y884 in the search box to learn more about \"Learning About Deep Vein Thrombosis. \"  Current as of: June 4, 2016  Content Version: 11.2  © 8016-6456 [employer].  Care instructions adapted under license by [employer] (which disclaims liability or warranty for this information). If you have questions about a medical condition or this instruction, always ask your healthcare professional. [address] any warranty or liability for your use of this information.

## 2017-05-23 NOTE — PROGRESS NOTES
Alicja Pedroza is a 44 y.o. male and presents with Complete Physical, lle edema/elevated bs,transaminitis. Subjective:    Actually would rather discuss problems. 1. CTS- had a bad experience at the hand surgeon. Would like a second opinion. 2. Left le swollen for about 2 months. No changes in that time- gets better at night. 3. transaminitis- mild- no review of [facility] labs . 4. Elevated blood sugar- A1c was 5.8 7/2016 on review of [facility] labs- pt concerned. Labs from march reviewed- elevated blood sugar. Assessment/Plan:    1. CTS- referral for second opinion placed. 2. Left LE edema- repeat PVL today- possible DVT discussed and importance of having this evaluated. 3. transaminitis- recheck lfts- neg hep panel. 4. Elevated blood sugar- possible prediabetes or diabetes discussed. RTC in one week. And pt told to call us by this evening if he does not hear about the results of the doppler. Orders Placed This Encounter    DUPLEX LOWER EXT VENOUS LEFT     Standing Status:   Future     Standing Expiration Date:   6/23/2018     Scheduling Instructions:      Pt requests close by if possible    HEPATIC FUNCTION PANEL     Standing Status:   Future     Standing Expiration Date:   5/24/2018    HEMOGLOBIN A1C WITH EAG     Standing Status:   Future     Standing Expiration Date:   5/24/2018    CBC WITH AUTOMATED DIFF     Standing Status:   Future     Standing Expiration Date:   5/24/2018    URINALYSIS W/ RFLX MICROSCOPIC     Standing Status:   Future     Standing Expiration Date:   5/24/2018    REFERRAL TO HAND SURGERY     Referral Priority:   Routine     Referral Type:   Consultation     Referral Reason:   Specialty Services Required           ROS:  Negative except as mentioned above  Cardiac- no chest pain or palpitations  Pulmonary- no sob or wheezes  GI- no n/v or diarrhea.         SH:  Social History   Substance Use Topics    Smoking status: Current Every Day Smoker    Smokeless tobacco: None      Comment: smokes e cig     Alcohol use No         Medications/Allergies:  Current Outpatient Prescriptions on File Prior to Visit   Medication Sig Dispense Refill    ibuprofen 100 mg tablet Take 100 mg by mouth every six (6) hours as needed for Pain. No current facility-administered medications on file prior to visit. No Known Allergies    Objective:  Visit Vitals    /60    Pulse 79    Temp 97.8 °F (36.6 °C) (Oral)    Resp 16    Ht 6' 2\" (1.88 m)    Wt 265 lb (120.2 kg)    BMI 34.02 kg/m2    Body mass index is 34.02 kg/(m^2). Constitutional: Well developed, nourished, no distress, alert   CV: S1, S2.  RRR. No murmurs/rubs. No thrills palpated. No carotid bruits. Intact distal pulses. Left LE with 1-2+ edema. 0 on right   Pulm: No abnormalities on inspection. Clear to auscultation bilaterally. No wheezing/rhonchi. Normal effort. GI: Soft, nontender, nondistended. Normal active bowel sounds. No  masses on palpation. No hepatosplenomegaly.

## 2017-05-23 NOTE — MR AVS SNAPSHOT
Visit Information Date & Time Provider Department Dept. Phone Encounter #  
 5/23/2017  8:30 AM William Morel, [facility] [phone] [id number] Follow-up Instructions Return in about 1 week (around 5/30/2017). Upcoming Health Maintenance Date Due Pneumococcal 19-64 Medium Risk (1 of 1 - PPSV23) 2/21/1998 DTaP/Tdap/Td series (1 - Tdap) 2/21/2000 INFLUENZA AGE 9 TO ADULT 8/1/2017 Allergies as of 5/23/2017  Review Complete On: 5/23/2017 By: William Morel MD  
 No Known Allergies Current Immunizations  Never Reviewed No immunizations on file. Not reviewed this visit You Were Diagnosed With   
  
 Codes Comments Bilateral carpal tunnel syndrome    -  Primary ICD-10-CM: G56.03 
ICD-9-CM: 354.0 Elevated blood sugar     ICD-10-CM: R73.9 ICD-9-CM: 790.29 Transaminitis     ICD-10-CM: R74.0 ICD-9-CM: 790.4 Edema of left lower extremity     ICD-10-CM: R60.0 ICD-9-CM: 782.3 Tobacco abuse     ICD-10-CM: Z72.0 ICD-9-CM: 305.1 Vitals BP Pulse Temp Resp Height(growth percentile) Weight(growth percentile) 130/60 79 97.8 °F (36.6 °C) (Oral) 16 6' 2\" (1.88 m) 265 lb (120.2 kg) BMI Smoking Status 34.02 kg/m2 Current Every Day Smoker Vitals History BMI and BSA Data Body Mass Index Body Surface Area 34.02 kg/m 2 2.51 m 2 Preferred Pharmacy Pharmacy Name Phone [address] [phone] Your Updated Medication List  
  
   
This list is accurate as of: 5/23/17  9:22 AM.  Always use your most recent med list.  
  
  
  
  
 ibuprofen 100 mg tablet Take 100 mg by mouth every six (6) hours as needed for Pain. We Performed the Following REFERRAL TO HAND SURGERY [REF31 Custom] Comments:  
 Please evaluate patient for bilat CTS- pt requests a second opinion. Follow-up Instructions Return in about 1 week (around 5/30/2017). To-Do List   
 05/23/2017 Lab:  CBC WITH AUTOMATED DIFF   
  
 05/23/2017 Imaging:  DUPLEX LOWER EXT VENOUS LEFT   
  
 05/23/2017 Lab:  HEMOGLOBIN A1C WITH EAG   
  
 05/23/2017 Lab:  HEPATIC FUNCTION PANEL   
  
 05/23/2017 Lab:  URINALYSIS W/ RFLX MICROSCOPIC Referral Information Referral ID Referred By Referred To  
  
 [id number] BILLY Asher Not Available Visits Status Start Date End Date 1 New Request 5/23/17 5/23/18 If your referral has a status of pending review or denied, additional information will be sent to support the outcome of this decision. Patient Instructions Leg and Ankle Edema: Care Instructions Your Care Instructions Swelling in the legs, ankles, and feet is called edema. It is common after you sit or stand for a while. Long plane flights or car rides often cause swelling in the legs and feet. You may also have swelling if you have to stand for long periods of time at your job. Problems with the veins in the legs (varicose veins) and changes in hormones can also cause swelling. Sometimes the swelling in the ankles and feet is caused by a more serious problem, such as heart failure, infection, blood clots, or liver or kidney disease. Follow-up care is a key part of your treatment and safety. Be sure to make and go to all appointments, and call your doctor if you are having problems. Its also a good idea to know your test results and keep a list of the medicines you take. How can you care for yourself at home? · If your doctor gave you medicine, take it as prescribed. Call your doctor if you think you are having a problem with your medicine. · Whenever you are resting, raise your legs up. Try to keep the swollen area higher than the level of your heart. · Take breaks from standing or sitting in one position. ¨ Walk around to increase the blood flow in your lower legs. ¨ Move your feet and ankles often while you stand, or tighten and relax your leg muscles. · Wear support stockings. Put them on in the morning, before swelling gets worse. · Eat a balanced diet. Lose weight if you need to. · Limit the amount of salt (sodium) in your diet. Salt holds fluid in the body and may increase swelling. When should you call for help? Call 911 anytime you think you may need emergency care. For example, call if: 
· You have symptoms of a blood clot in your lung (called a pulmonary embolism). These may include: 
¨ Sudden chest pain. ¨ Trouble breathing. ¨ Coughing up blood. Call your doctor now or seek immediate medical care if: 
· You have signs of a blood clot, such as: 
¨ Pain in your calf, back of the knee, thigh, or groin. ¨ Redness and swelling in your leg or groin. · You have symptoms of infection, such as: 
¨ Increased pain, swelling, warmth, or redness. ¨ Red streaks or pus. ¨ A fever. Watch closely for changes in your health, and be sure to contact your doctor if: 
· Your swelling is getting worse. · You have new or worsening pain in your legs. · You do not get better as expected. Where can you learn more? Go to http://seema-jaymie.info/. Enter W967 in the search box to learn more about \"Leg and Ankle Edema: Care Instructions. \" Current as of: May 27, 2016 Content Version: 11.2 © 4378-6092 GeoVario. Care instructions adapted under license by Brainient (which disclaims liability or warranty for this information). If you have questions about a medical condition or this instruction, always ask your healthcare professional. [place] [address] any warranty or liability for your use of this information. Deep Vein Thrombosis: Care Instructions Your Care Instructions A deep vein thrombosis (DVT) is a blood clot in certain veins of the legs, pelvis, or arms. Blood clots in these veins need to be treated because they can get bigger, break loose, and travel through the bloodstream to the lungs. A blood clot in a lung can be life-threatening. The doctor may have given you a blood thinner (anticoagulant). A blood thinner can stop the blood clot from growing larger and prevent new clots from forming. You will need to take a blood thinner for 3 to 6 months or longer. The doctor has checked you carefully, but problems can develop later. If you notice any problems or new symptoms, get medical treatment right away. Follow-up care is a key part of your treatment and safety. Be sure to make and go to all appointments, and call your doctor if you are having problems. It's also a good idea to know your test results and keep a list of the medicines you take. How can you care for yourself at home? · Take your medicines exactly as prescribed. Call your doctor if you think you are having a problem with your medicine. · If you are taking a blood thinner, be sure you get instructions about how to take your medicine safely. Blood thinners can cause serious bleeding problems. · Wear compression stockings if your doctor recommends them. These stockings are tighter at the feet than on the legs. They may reduce pain and swelling in your legs. You can get them with a prescription at a medical supply store or at some drugstores. · When you sit, use a pillow to raise the arm or leg that has the blood clot. Try to keep it above the level of your heart. When should you call for help? Call 911 anytime you think you may need emergency care. For example, call if: 
· You passed out (lost consciousness). · You have symptoms of a blood clot in your lung (called a pulmonary embolism). These include: 
¨ Sudden chest pain. ¨ Trouble breathing. ¨ Coughing up blood. Call your doctor now or seek immediate medical care if: 
· You have new or worse trouble breathing. · You are dizzy or lightheaded, or you feel like you may faint. · You have symptoms of a blood clot in your arm or leg. These may include: 
¨ Pain in the arm, calf, back of the knee, thigh, or groin. ¨ Redness and swelling in the arm, leg, or groin. Watch closely for changes in your health, and be sure to contact your doctor if: 
· You do not get better as expected. Where can you learn more? Go to http://seemaSyncbakjaymie.info/. Enter N538 in the search box to learn more about \"Deep Vein Thrombosis: Care Instructions. \" Current as of: October 13, 2016 Content Version: 11.2 © 6222-4322 GetTaxi. Care instructions adapted under license by [employer] Plus (which disclaims liability or warranty for this information). If you have questions about a medical condition or this instruction, always ask your healthcare professional. [place] [address] any warranty or liability for your use of this information. Learning About Deep Vein Thrombosis What is deep vein thrombosis? A deep vein thrombosis (DVT) is a blood clot in certain veins of the legs, pelvis, or arms. The clot is usually in the legs. DVT may damage the vein and cause the area to ache, swell, and change color. DVT also can lead to sores. DVT in these veins needs to be treated because the clots can get bigger, break loose, and travel through the bloodstream to the lungs. A blood clot in a lung can cause death. Blood clots can form in the veins when you are not active for a long period of time. For example, they can form if you need to stay in bed because of a health problem or must sit for a long time on an airplane or in a car. Surgery or an injury can damage your blood vessels and cause a clot to form. Cancer also can cause DVT. And some people have blood that clots too easily, which is a problem that may run in families. A risk factor is something that makes you more likely to develop a disease. Here are some major risk factors for DVT: 
· You have surgery. · You have to stay in bed for more than 3 days (such as in the hospital). · Your blood is likely to clot because of an injury, cancer, or inherited condition. Here are some minor risk factors for DVT: 
· You take birth control hormones. · You are pregnant. · You are in a car or airplane for a long trip. What are the symptoms? Symptoms of DVT may include: · Swelling in the affected area. · Redness and warmth in the affected area. · Pain or tenderness. You may have pain only when you touch the affected area or when you stand or walk. If your doctor thinks you may have DVT, you will probably have an ultrasound test. You may have other tests as well. How can you prevent DVT? · Exercise your lower leg muscles to help blood flow in your legs. Point your toes up toward your head so the calves of your legs are stretched, then relax and repeat. This is a good exercise to do when you are sitting for long periods of time. · Get out of bed as soon as you can after an illness or surgery. If you need to stay in bed, do the leg exercise noted above every hour when you are awake. · Use special stockings called compression stockings. These stockings are tight at the feet with a gradually looser fit on the leg. Many doctors recommend that you wear compression stockings during a journey longer than 8 hours. · Take breaks when you are on long trips. Stop the car and walk around. On long airplane flights, walk up and down the aisle hourly, flex and point your feet every 20 minutes while sitting, and drink plenty of water. · Take blood-thinning medicines after some types of surgery if your doctor recommends it. Blood thinners also may be used if you are likely to develop clots. How is DVT treated? Treatment for DVT usually involves taking blood thinners. These medicines are given through a vein (intravenously, or IV) or as a pill. Talk with your doctor about which medicine is right for you. Your doctor also may suggest that you prop up or elevate your leg when possible, take walks, and wear compression stockings. These measures may help reduce the pain and swelling that can happen with DVT. Follow-up care is a key part of your treatment and safety. Be sure to make and go to all appointments, and call your doctor if you are having problems. It's also a good idea to know your test results and keep a list of the medicines you take. Where can you learn more? Go to http://seema-jaymie.info/. Enter B271 in the search box to learn more about \"Learning About Deep Vein Thrombosis. \" Current as of: June 4, 2016 Content Version: 11.2 © 7434-6345 Spurfly, Incorporated. Care instructions adapted under license by Meddle (which disclaims liability or warranty for this information). If you have questions about a medical condition or this instruction, always ask your healthcare professional. [place] [address] any warranty or liability for your use of this information. Introducing [facility]! Sarah Diamond introduces [employer] patient portal. Now you can access parts of your medical record, email your doctor's office, and request medication refills online. 1. In your internet browser, go to [email or web address]. [email or web address]/[email or web address] 2. Click on the First Time User? Click Here link in the Sign In box. You will see the New Member Sign Up page. 3. Enter your TargAnox Access Code exactly as it appears below. You will not need to use this code after youve completed the sign-up process. If you do not sign up before the expiration date, you must request a new code. · TargAnox Access Code: [email or web address] Expires: 6/19/2017  9:55 AM 
 
4. Enter the last four digits of your Social Security Number (xxxx) and Date of Birth (mm/dd/yyyy) as indicated and click Submit. You will be taken to the next sign-up page. 5. Create a TargAnox ID. This will be your TargAnox login ID and cannot be changed, so think of one that is secure and easy to remember. 6. Create a TargAnox password. You can change your password at any time. 7. Enter your Password Reset Question and Answer. This can be used at a later time if you forget your password. 8. Enter your e-mail address. You will receive e-mail notification when new information is available in [address] 9. Click Sign Up. You can now view and download portions of your medical record. 10. Click the Download Summary menu link to download a portable copy of your medical information. If you have questions, please visit the Frequently Asked Questions section of the TargAnox website. Remember, TargAnox is NOT to be used for urgent needs. For medical emergencies, dial 911. Now available from your iPhone and Android! Please provide this summary of care documentation to your next provider. Your primary care clinician is listed as MANE [address]. If you have any questions after today's visit, please call [phone].

## 2017-05-23 NOTE — PROGRESS NOTES
1. Have you been to the ER, urgent care clinic since your last visit? Hospitalized since your last visit? No.     2. Have you seen or consulted any other health care providers outside of the [address] [place] [address] since your last visit? Include any pap smears or colon screening. No.     Patient presents with Complete Physical Exam and also complaining of left leg and foot.

## 2017-05-24 ENCOUNTER — DOCUMENTATION ONLY (OUTPATIENT)
Dept: FAMILY MEDICINE CLINIC | Age: 37
End: 2017-05-24

## 2017-05-24 LAB
AVG GLU, 10930: 117 MG/DL (ref 91–123)
HBA1C MFR BLD HPLC: 5.7 % (ref 4.8–5.9)

## 2017-05-24 NOTE — PROGRESS NOTES
I received his PVL results via faxed and showed it to Dr. Eva Ribera for review. Based on the results patient is aware PVL Left LE showed no evidence of DVT.

## 2017-05-31 ENCOUNTER — OFFICE VISIT (OUTPATIENT)
Dept: FAMILY MEDICINE CLINIC | Age: 37
End: 2017-05-31

## 2017-05-31 VITALS
HEIGHT: 74 IN | TEMPERATURE: 97.5 F | WEIGHT: 265.8 LBS | DIASTOLIC BLOOD PRESSURE: 68 MMHG | SYSTOLIC BLOOD PRESSURE: 124 MMHG | HEART RATE: 75 BPM | BODY MASS INDEX: 34.11 KG/M2 | RESPIRATION RATE: 17 BRPM

## 2017-05-31 DIAGNOSIS — E78.2 MIXED HYPERLIPIDEMIA: ICD-10-CM

## 2017-05-31 DIAGNOSIS — R74.01 TRANSAMINITIS: ICD-10-CM

## 2017-05-31 DIAGNOSIS — E66.9 OBESITY (BMI 30-39.9): ICD-10-CM

## 2017-05-31 DIAGNOSIS — R60.0 LEG EDEMA, LEFT: Primary | ICD-10-CM

## 2017-05-31 DIAGNOSIS — R82.998 LEUKOCYTES IN URINE: ICD-10-CM

## 2017-05-31 NOTE — PROGRESS NOTES
1. Have you been to the ER, urgent care clinic since your last visit? Hospitalized since your last visit? No.      2. Have you seen or consulted any other health care providers outside of the [address] [place] [address] since your last visit? Include any pap smears or colon screening. Yes, saw Dr. Padmini Ko in 4/2017. Patient presents with follow up swelling leg and ankle.

## 2017-05-31 NOTE — MR AVS SNAPSHOT
Visit Information Date & Time Provider Department Dept. Phone Encounter #  
 5/31/2017  8:15 AM Karissa Kaufman, [address] [facility] [phone] [id number] Follow-up Instructions Return in about 4 weeks (around 6/28/2017) for 30 minute slot and fasting labs in the interim. Hernandez Rendon Upcoming Health Maintenance Date Due Pneumococcal 19-64 Medium Risk (1 of 1 - PPSV23) 2/21/1998 DTaP/Tdap/Td series (1 - Tdap) 2/21/2000 INFLUENZA AGE 9 TO ADULT 8/1/2017 Allergies as of 5/31/2017  Review Complete On: 5/31/2017 By: Karissa Kaufman MD  
 No Known Allergies Current Immunizations  Never Reviewed No immunizations on file. Not reviewed this visit You Were Diagnosed With   
  
 Codes Comments Leg edema, left    -  Primary ICD-10-CM: R60.0 ICD-9-CM: 782.3 Transaminitis     ICD-10-CM: R74.0 ICD-9-CM: 790.4 Mixed hyperlipidemia     ICD-10-CM: E78.2 ICD-9-CM: 272.2 Leukocytes in urine     ICD-10-CM: R82.99 
ICD-9-CM: 791.7 Vitals BP Pulse Temp Resp Height(growth percentile) Weight(growth percentile) 124/68 75 97.5 °F (36.4 °C) (Oral) 17 6' 2\" (1.88 m) 265 lb 12.8 oz (120.6 kg) BMI Smoking Status 34.13 kg/m2 Current Every Day Smoker BMI and BSA Data Body Mass Index Body Surface Area  
 34.13 kg/m 2 2.51 m 2 Preferred Pharmacy Pharmacy Name Phone [address] [phone] Your Updated Medication List  
  
   
This list is accurate as of: 5/31/17  9:13 AM.  Always use your most recent med list.  
  
  
  
  
 ibuprofen 100 mg tablet Take 100 mg by mouth every six (6) hours as needed for Pain. We Performed the Following REFERRAL TO VASCULAR SURGERY [LFI909 Custom] Comments:  
 Please evaluate patient for left LE edema Follow-up Instructions Return in about 4 weeks (around 6/28/2017) for 30 minute slot and fasting labs in the interim. Hilary Line To-Do List   
 05/31/2017 Lab:  ALPHA-1-ANTITRYPSIN, TOTAL   
  
 05/31/2017 Lab:  ESTEFANI QL, W/REFLEX CASCADE   
  
 05/31/2017 Lab:  CERULOPLASMIN   
  
 05/31/2017 Lab:  CT/NG/T.VAGINALIS AMPLIFICATION   
  
 05/31/2017 Microbiology:  CULTURE, URINE   
  
 05/31/2017 Lab:  FERRITIN   
  
 05/31/2017 Lab:  HEP B SURFACE AG   
  
 05/31/2017 Lab:  HEPATITIS C AB   
  
 05/31/2017 Lab:  LIPID PANEL   
  
 05/31/2017 Imaging:  US ABD LTD Referral Information Referral ID Referred By Referred To  
  
 [id number] BILLY Lewis Not Available Visits Status Start Date End Date 1 New Request 5/31/17 5/31/18 If your referral has a status of pending review or denied, additional information will be sent to support the outcome of this decision. Patient Instructions Leg and Ankle Edema: Care Instructions Your Care Instructions Swelling in the legs, ankles, and feet is called edema. It is common after you sit or stand for a while. Long plane flights or car rides often cause swelling in the legs and feet. You may also have swelling if you have to stand for long periods of time at your job. Problems with the veins in the legs (varicose veins) and changes in hormones can also cause swelling. Sometimes the swelling in the ankles and feet is caused by a more serious problem, such as heart failure, infection, blood clots, or liver or kidney disease. Follow-up care is a key part of your treatment and safety. Be sure to make and go to all appointments, and call your doctor if you are having problems. Its also a good idea to know your test results and keep a list of the medicines you take. How can you care for yourself at home? · If your doctor gave you medicine, take it as prescribed. Call your doctor if you think you are having a problem with your medicine. · Whenever you are resting, raise your legs up. Try to keep the swollen area higher than the level of your heart. · Take breaks from standing or sitting in one position. ¨ Walk around to increase the blood flow in your lower legs. ¨ Move your feet and ankles often while you stand, or tighten and relax your leg muscles. · Wear support stockings. Put them on in the morning, before swelling gets worse. · Eat a balanced diet. Lose weight if you need to. · Limit the amount of salt (sodium) in your diet. Salt holds fluid in the body and may increase swelling. When should you call for help? Call 911 anytime you think you may need emergency care. For example, call if: 
· You have symptoms of a blood clot in your lung (called a pulmonary embolism). These may include: 
¨ Sudden chest pain. ¨ Trouble breathing. ¨ Coughing up blood. Call your doctor now or seek immediate medical care if: 
· You have signs of a blood clot, such as: 
¨ Pain in your calf, back of the knee, thigh, or groin. ¨ Redness and swelling in your leg or groin. · You have symptoms of infection, such as: 
¨ Increased pain, swelling, warmth, or redness. ¨ Red streaks or pus. ¨ A fever. Watch closely for changes in your health, and be sure to contact your doctor if: 
· Your swelling is getting worse. · You have new or worsening pain in your legs. · You do not get better as expected. Where can you learn more? Go to http://seema-jaymie.info/. Enter J187 in the search box to learn more about \"Leg and Ankle Edema: Care Instructions. \" Current as of: May 27, 2016 Content Version: 11.2 © 2358-4021 Healthwise, Incorporated. Care instructions adapted under license by [employer] Together (which disclaims liability or warranty for this information). If you have questions about a medical condition or this instruction, always ask your healthcare professional. [address] any warranty or liability for your use of this information. Call [phone] to schedule the ultrasound if Sara does not call you today. Introducing [facility]! Tanvir Gonzáles introduces [employer] patient portal. Now you can access parts of your medical record, email your doctor's office, and request medication refills online. 1. In your internet browser, go to [email or web address] 2. Click on the First Time User? Click Here link in the Sign In box. You will see the New Member Sign Up page. 3. Enter your Producteev Access Code exactly as it appears below. You will not need to use this code after youve completed the sign-up process. If you do not sign up before the expiration date, you must request a new code. · Producteev Access Code: [email or web address] Expires: 6/19/2017  9:55 AM 
 
4. Enter the last four digits of your Social Security Number (xxxx) and Date of Birth (mm/dd/yyyy) as indicated and click Submit. You will be taken to the next sign-up page. 5. Create a Producteev ID. This will be your Producteev login ID and cannot be changed, so think of one that is secure and easy to remember. 6. Create a Producteev password. You can change your password at any time. 7. Enter your Password Reset Question and Answer. This can be used at a later time if you forget your password. 8. Enter your e-mail address. You will receive e-mail notification when new information is available in [address] 9. Click Sign Up. You can now view and download portions of your medical record. 10. Click the Download Summary menu link to download a portable copy of your medical information. If you have questions, please visit the Frequently Asked Questions section of the [employer] website. Remember, Splashscore is NOT to be used for urgent needs. For medical emergencies, dial 911. Now available from your iPhone and Android! Please provide this summary of care documentation to your next provider. Your primary care clinician is listed as MANE Soliman. If you have any questions after today's visit, please call [phone].

## 2017-05-31 NOTE — PATIENT INSTRUCTIONS
Leg and Ankle Edema: Care Instructions  Your Care Instructions  Swelling in the legs, ankles, and feet is called edema. It is common after you sit or stand for a while. Long plane flights or car rides often cause swelling in the legs and feet. You may also have swelling if you have to stand for long periods of time at your job. Problems with the veins in the legs (varicose veins) and changes in hormones can also cause swelling. Sometimes the swelling in the ankles and feet is caused by a more serious problem, such as heart failure, infection, blood clots, or liver or kidney disease. Follow-up care is a key part of your treatment and safety. Be sure to make and go to all appointments, and call your doctor if you are having problems. Its also a good idea to know your test results and keep a list of the medicines you take. How can you care for yourself at home? · If your doctor gave you medicine, take it as prescribed. Call your doctor if you think you are having a problem with your medicine. · Whenever you are resting, raise your legs up. Try to keep the swollen area higher than the level of your heart. · Take breaks from standing or sitting in one position. ¨ Walk around to increase the blood flow in your lower legs. ¨ Move your feet and ankles often while you stand, or tighten and relax your leg muscles. · Wear support stockings. Put them on in the morning, before swelling gets worse. · Eat a balanced diet. Lose weight if you need to. · Limit the amount of salt (sodium) in your diet. Salt holds fluid in the body and may increase swelling. When should you call for help? Call 911 anytime you think you may need emergency care. For example, call if:  · You have symptoms of a blood clot in your lung (called a pulmonary embolism). These may include:  ¨ Sudden chest pain. ¨ Trouble breathing. ¨ Coughing up blood.   Call your doctor now or seek immediate medical care if:  · You have signs of a blood clot, such as:  ¨ Pain in your calf, back of the knee, thigh, or groin. ¨ Redness and swelling in your leg or groin. · You have symptoms of infection, such as:  ¨ Increased pain, swelling, warmth, or redness. ¨ Red streaks or pus. ¨ A fever. Watch closely for changes in your health, and be sure to contact your doctor if:  · Your swelling is getting worse. · You have new or worsening pain in your legs. · You do not get better as expected. Where can you learn more? Go to http://seema-jaymie.info/. Enter C026 in the search box to learn more about \"Leg and Ankle Edema: Care Instructions. \"  Current as of: May 27, 2016  Content Version: 11.2  © 6916-7676 [employer]. Care instructions adapted under license by [employer] (which disclaims liability or warranty for this information). If you have questions about a medical condition or this instruction, always ask your healthcare professional. [place] [address] any warranty or liability for your use of this information. Call [phone] to schedule the ultrasound if Sara does not call you today.

## 2017-05-31 NOTE — PROGRESS NOTES
Min Mcrae is a 44 y.o. male and presents with Follow Up Chronic Condition; Leg Swelling; Ankle swelling; and Results (lab)       Subjective:    Left LE edema-negative doppler for DVT  transaminitis-no etoh for 2 months. Elevated blood sugar- no polyuria. Reviewed A1c of 5.7% with pt and that this may mean prediabetes. leukuria- no dysuria . No hematuria. No h/o std. Assessment/Plan:    Left LE edema-negative doppler- will refer to vascular for additional opinion- ? Venous insufficiency- discussed less likely mass or other venous obstructive process in pelvis as well and importance of evaluation. transaminitis-labs and u/s- reviewed DDX with pt and answered questions. leukuria- urine culture and g-c/chlamydia testing- also reviewed with pt. No sx so will hold on tx at this point but importance of evaluation of all problems stressed. Elevated blood sugar- continue to follow. Weight loss and exercise stressed. Obesity-importance of caloric restriction and wt loss discussed.        RTC in 1 month with fasting labs and first  Orders Placed This Encounter    CULTURE, URINE     Standing Status:   Future     Standing Expiration Date:   6/1/2018    US ABD LTD     Standing Status:   Future     Standing Expiration Date:   6/30/2018     Order Specific Question:   Specific Body Part     Answer:   right upper quadrant    HEPATITIS C AB     Standing Status:   Future     Standing Expiration Date:   6/1/2018    CERULOPLASMIN     Standing Status:   Future     Standing Expiration Date:   6/1/2018    ALPHA-1-ANTITRYPSIN, TOTAL     Standing Status:   Future     Standing Expiration Date:   6/1/2018    FERRITIN     Standing Status:   Future     Standing Expiration Date:   6/1/2018    HEP B SURFACE AG     Standing Status:   Future     Standing Expiration Date:   6/1/2018   Courtney Melo, W/REFLEX CASCADE     Standing Status:   Future     Standing Expiration Date:   6/1/2018    LIPID PANEL     Standing Status: Future     Standing Expiration Date:   6/1/2018    CT/NG/T.VAGINALIS AMPLIFICATION     Standing Status:   Future     Standing Expiration Date:   6/1/2018     Order Specific Question:   Specimen type     Answer:   Urine [258]    REFERRAL TO VASCULAR SURGERY     Referral Priority:   Routine     Referral Type:   Consultation     Referral Reason:   Specialty Services Required   Queen Rupert was seen today for follow up chronic condition, leg swelling, ankle swelling and results. Diagnoses and all orders for this visit:    Leg edema, left  -     REFERRAL TO VASCULAR SURGERY    Transaminitis  -     HEPATITIS C AB; Future  -     CERULOPLASMIN; Future  -     ALPHA-1-ANTITRYPSIN, TOTAL; Future  -     FERRITIN; Future  -     HEP B SURFACE AG; Future  -     ESTEFANI QL, W/REFLEX CASCADE; Future  -     US ABD LTD; Future    Mixed hyperlipidemia  -     LIPID PANEL; Future    Leukocytes in urine  -     CULTURE, URINE; Future  -     CT/NG/T.VAGINALIS AMPLIFICATION; Future              ROS:  Negative except as mentioned above  Cardiac- no chest pain or palpitations  Pulmonary- no sob or wheezes  GI- no n/v or diarrhea. SH:  Social History   Substance Use Topics    Smoking status: Current Every Day Smoker    Smokeless tobacco: None      Comment: smokes e cig     Alcohol use No         Medications/Allergies:  Current Outpatient Prescriptions on File Prior to Visit   Medication Sig Dispense Refill    ibuprofen 100 mg tablet Take 100 mg by mouth every six (6) hours as needed for Pain. No current facility-administered medications on file prior to visit. No Known Allergies    Objective:  Visit Vitals    /68    Pulse 75    Temp 97.5 °F (36.4 °C) (Oral)    Resp 17    Ht 6' 2\" (1.88 m)    Wt 265 lb 12.8 oz (120.6 kg)    BMI 34.13 kg/m2    Body mass index is 34.13 kg/(m^2). Constitutional: Well developed, nourished, no distress, alert   CV: S1, S2.  RRR. No murmurs/rubs. No thrills palpated.   No carotid bruits. Intact distal pulses. 2+ pretibial edema. Pulm: No abnormalities on inspection. Clear to auscultation bilaterally. No wheezing/rhonchi. Normal effort. GI: Soft, nontender, nondistended. Normal active bowel sounds. No  masses on palpation. No hepatosplenomegaly.

## 2017-06-01 LAB
CHOLEST SERPL-MCNC: 191 MG/DL (ref 110–200)
FERRITIN SERPL-MCNC: 345 NG/ML (ref 22–322)
HDLC SERPL-MCNC: 37 MG/DL (ref 40–59)
LDLC SERPL CALC-MCNC: 96 MG/DL (ref 50–99)
TRIGL SERPL-MCNC: 294 MG/DL (ref 40–149)
VLDLC SERPL CALC-MCNC: 59 MG/DL (ref 8–30)

## 2017-06-01 NOTE — PROGRESS NOTES
Patient called and wanted his order for [address] abdomen fax to [facility] instead of [facility] . Faxed order to [phone]. I also gave him the central scheduling phone number [phone] if he wants to check the status of his appointment.

## 2017-06-02 LAB
CHLAMYDIA AMPLIFIED URINE: NEGATIVE
GC AMPLIFIED URINE,919: NEGATIVE
HCV AB SER IA-ACNC: NORMAL
HEP B SURFACE AG SCRN, 006510: NORMAL
TRICH NUCU, 17621: NEGATIVE

## 2017-06-03 LAB — CULTURE RESULT, SENTARA: NORMAL

## 2017-06-05 DIAGNOSIS — R60.0 EDEMA OF LEFT LOWER EXTREMITY: ICD-10-CM

## 2017-06-05 LAB
MISCELLANEOUS TEST,99000: NORMAL
SENT TO, 434: NORMAL
TEST NAME, 435: NORMAL

## 2017-06-16 DIAGNOSIS — R74.01 TRANSAMINITIS: ICD-10-CM

## 2017-06-26 ENCOUNTER — OFFICE VISIT (OUTPATIENT)
Dept: FAMILY MEDICINE CLINIC | Age: 37
End: 2017-06-26

## 2017-06-26 VITALS
RESPIRATION RATE: 17 BRPM | HEIGHT: 74 IN | BODY MASS INDEX: 33.88 KG/M2 | WEIGHT: 264 LBS | DIASTOLIC BLOOD PRESSURE: 71 MMHG | HEART RATE: 75 BPM | TEMPERATURE: 97.5 F | SYSTOLIC BLOOD PRESSURE: 142 MMHG

## 2017-06-26 DIAGNOSIS — E78.2 MIXED HYPERLIPIDEMIA: ICD-10-CM

## 2017-06-26 DIAGNOSIS — E66.9 OBESITY (BMI 30-39.9): ICD-10-CM

## 2017-06-26 DIAGNOSIS — K76.0 HEPATIC STEATOSIS: ICD-10-CM

## 2017-06-26 DIAGNOSIS — K80.20 CALCULUS OF GALLBLADDER WITHOUT CHOLECYSTITIS WITHOUT OBSTRUCTION: Primary | ICD-10-CM

## 2017-06-26 NOTE — PATIENT INSTRUCTIONS
Nonalcoholic Steatohepatitis (BAKER): Care Instructions  Your Care Instructions    Nonalcoholic steatohepatitis (BAKER) is liver inflammation. It is caused by a buildup of fat in the liver. The fat buildup is not caused by drinking alcohol. Because of the inflammation, the liver does not work as well as it should. BAKER is part of a group of liver diseases called nonalcoholic fatty liver disease. In these diseases, fat builds up in the liver and sometimes causes liver damage. This damage can get worse over time. Follow-up care is a key part of your treatment and safety. Be sure to make and go to all appointments, and call your doctor if you are having problems. It's also a good idea to know your test results and keep a list of the medicines you take. How can you care for yourself at home? · Stay at a healthy weight. Or if you need to, slowly get to a healthy weight. · Control your cholesterol. Talk to your doctor about ways to lower your cholesterol, if needed. You might try getting active, taking medicines, and making healthy changes to your diet. · Eat healthy foods. This includes fruits, vegetables, lean meats and dairy, and whole grains. · If you have diabetes, keep your blood sugar at your target level. · Get at least 30 minutes of exercise on most days of the week. Walking is a good choice. You also may want to do other activities, such as running, swimming, cycling, or playing tennis or team sports. · Limit alcohol, or do not drink. Alcohol can damage the liver and cause health problems. When should you call for help? Call your doctor now or seek immediate medical care if:  · You have yellowing of the skin or the whites of the eyes. (This is called jaundice.)  · You have pain in the upper right part of your belly. Watch closely for changes in your health, and be sure to contact your doctor if:  · You have swelling in your legs or belly. · Your skin itches. Where can you learn more?   Go to http://seema-jaymie.info/. Enter Y322 in the search box to learn more about \"Nonalcoholic Steatohepatitis (BAKER): Care Instructions. \"  Current as of: August 9, 2016  Content Version: 11.3  © 1949-2508 [email or web address]. Care instructions adapted under license by [employer] (which disclaims liability or warranty for this information). If you have questions about a medical condition or this instruction, always ask your healthcare professional. [address] any warranty or liability for your use of this information. Biliary Colic: Care Instructions  Your Care Instructions    Biliary (say \"BILL-ee-air-ee\") colic is belly pain caused by gallbladder problems. It is usually caused by a gallstone moving through or blocking the common bile duct or cystic duct. Gallstones are stones that form in the gallbladder. They are made of cholesterol and other substances. The gallbladder is a small sac located just under the liver. It stores bile released by the liver. Bile helps you digest fats. Gallstones also can form in the common bile duct or cystic duct. These ducts carry bile from the gallbladder and the liver to the small intestine. Gallstones may be as small as a grain of sand or as large as a golf ball. Gallstones that cause severe symptoms usually are treated with surgery to remove the gallbladder. If the first attack of biliary colic is mild, it is often safe to wait until you have had another attack before you think about having surgery. The doctor has checked you carefully, but problems can develop later. If you notice any problems or new symptoms, get medical treatment right away. Follow-up care is a key part of your treatment and safety. Be sure to make and go to all appointments, and call your doctor if you are having problems. It's also a good idea to know your test results and keep a list of the medicines you take.   How can you care for yourself at home? · Take pain medicines exactly as directed. ¨ If the doctor gave you a prescription medicine for pain, take it as prescribed. ¨ If you are not taking a prescription pain medicine, ask your doctor if you can take an over-the-counter medicine. Read and follow all instructions on the label. · Avoid foods that cause symptoms, especially fatty foods. These can cause biliary colic. · You may need more tests to look at your gallbladder. When should you call for help? Call your doctor now or seek immediate medical care if:  · You have a fever. · You have new belly pain, or your pain gets worse. · There is a new or increasing yellow tint to your skin or the whites of your eyes. · Your urine is dark yellow-brown, or your stools are light-colored or white. · You cannot keep down fluids. Watch closely for changes in your health, and be sure to contact your doctor if:  · You do not get better as expected. · You are not getting better after 1 day (24 hours). Where can you learn more? Go to http://seema-jaymie.info/. Enter D925 in the search box to learn more about \"Biliary Colic: Care Instructions. \"  Current as of: August 9, 2016  Content Version: 11.3  © 1913-7580 [employer]. Care instructions adapted under license by [employer] (which disclaims liability or warranty for this information). If you have questions about a medical condition or this instruction, always ask your healthcare professional. [place] [address] any warranty or liability for your use of this information. Learning About Gallstones  What are gallstones? Gallstones are stones that form in the gallbladder. The gallbladder is a small sac located just under the liver. It stores bile released by the liver. Bile helps you digest fats. Gallstones can be smaller than a grain of sand or as large as a golf ball.   Gallstones form when cholesterol and other things found in bile make stones. They can also form if the gallbladder doesn't empty as it should. Gallstones can also form in the common bile duct or cystic duct. These tubes carry bile from the gallbladder and the liver to the small intestine. What happens when you have gallstones? Gallstones can cause many different problems, such as:  · A blockage in the common bile duct. · Inflammation or infection of the gallbladder (acute cholecystitis). This can happen when a gallstone blocks the cystic duct. · Inflammation or infection of the common bile duct (cholangitis). This can happen when gallstones get stuck in the common bile duct. In rare cases, this can damage the liver or spread infection. · Pancreatitis, an inflammation of the pancreas. What are the symptoms? · Most people who have gallstones don't have symptoms. · Symptoms can include:  ¨ Mild pain in the pit of your stomach or in the upper right part of your belly. It may spread to your right upper back or shoulder blade area. ¨ Severe pain that may come and go or be steady. It may get worse when you eat. ¨ Fever and chills, if a gallstone is blocking a bile duct and causing an infection. ¨ Yellowing of your skin and the whites of your eyes. How can you prevent gallstones? There is no sure way to prevent gallstones. But you can reduce your risk of forming gallstones that can cause symptoms. · Stay at a healthy weight. If you need to lose weight, do so slowly and sensibly. · Eat regular, balanced meals. · Be active, and exercise regularly. How are gallstones treated? · If you don't have symptoms, you probably don't need treatment. · For mild symptoms, your doctor may have you take pain medicine and wait to see if the pain goes away. · For severe pain or infection, or if you have more than one gallstone attack, your doctor may suggest surgery to have your gallbladder removed. The body works fine without a gallbladder.   Follow-up care is a key part of your treatment and safety. Be sure to make and go to all appointments, and call your doctor if you are having problems. It's also a good idea to know your test results and keep a list of the medicines you take. Where can you learn more? Go to http://seema-jaymie.info/. Enter  in the search box to learn more about \"Learning About Gallstones. \"  Current as of: August 9, 2016  Content Version: 11.3  © 4028-9010 [employer]. Care instructions adapted under license by [employer] (which disclaims liability or warranty for this information). If you have questions about a medical condition or this instruction, always ask your healthcare professional. [address] any warranty or liability for your use of this information.

## 2017-06-26 NOTE — MR AVS SNAPSHOT
Visit Information Date & Time Provider Department Dept. Phone Encounter #  
 6/26/2017  9:15 AM Adam Heller, [address] [place] [address] [phone] [id number] Follow-up Instructions Return in about 6 months (around 12/26/2017) for with labs prior. Trevon Vyas Upcoming Health Maintenance Date Due Pneumococcal 19-64 Medium Risk (1 of 1 - PPSV23) 2/21/1998 DTaP/Tdap/Td series (1 - Tdap) 2/21/2000 INFLUENZA AGE 9 TO ADULT 8/1/2017 Allergies as of 6/26/2017  Review Complete On: 6/26/2017 By: Adam Heller MD  
 No Known Allergies Current Immunizations  Never Reviewed No immunizations on file. Not reviewed this visit You Were Diagnosed With   
  
 Codes Comments Calculus of gallbladder without cholecystitis without obstruction    -  Primary ICD-10-CM: K80.20 ICD-9-CM: 574.20 Mixed hyperlipidemia     ICD-10-CM: E78.2 ICD-9-CM: 272.2 Obesity (BMI 30-39. 9)     ICD-10-CM: E66.9 ICD-9-CM: 278.00 Hepatic steatosis     ICD-10-CM: K76.0 ICD-9-CM: 571.8 Vitals BP Pulse Temp Resp Height(growth percentile) Weight(growth percentile) 142/71 75 97.5 °F (36.4 °C) (Oral) 17 6' 2\" (1.88 m) 264 lb (119.7 kg) BMI Smoking Status 33.9 kg/m2 Current Every Day Smoker BMI and BSA Data Body Mass Index Body Surface Area 33.9 kg/m 2 2.5 m 2 Preferred Pharmacy Pharmacy Name Phone [address] [phone] Your Updated Medication List  
  
   
This list is accurate as of: 6/26/17  9:44 AM.  Always use your most recent med list.  
  
  
  
  
 ibuprofen 100 mg tablet Take 100 mg by mouth every six (6) hours as needed for Pain. Follow-up Instructions Return in about 6 months (around 12/26/2017) for with labs prior. .  
  
  
Patient Instructions Nonalcoholic Steatohepatitis (BAKER): Care Instructions Your Care Instructions Nonalcoholic steatohepatitis (BAKER) is liver inflammation. It is caused by a buildup of fat in the liver. The fat buildup is not caused by drinking alcohol. Because of the inflammation, the liver does not work as well as it should. BAKER is part of a group of liver diseases called nonalcoholic fatty liver disease. In these diseases, fat builds up in the liver and sometimes causes liver damage. This damage can get worse over time. Follow-up care is a key part of your treatment and safety. Be sure to make and go to all appointments, and call your doctor if you are having problems. It's also a good idea to know your test results and keep a list of the medicines you take. How can you care for yourself at home? · Stay at a healthy weight. Or if you need to, slowly get to a healthy weight. · Control your cholesterol. Talk to your doctor about ways to lower your cholesterol, if needed. You might try getting active, taking medicines, and making healthy changes to your diet. · Eat healthy foods. This includes fruits, vegetables, lean meats and dairy, and whole grains. · If you have diabetes, keep your blood sugar at your target level. · Get at least 30 minutes of exercise on most days of the week. Walking is a good choice. You also may want to do other activities, such as running, swimming, cycling, or playing tennis or team sports. · Limit alcohol, or do not drink. Alcohol can damage the liver and cause health problems. When should you call for help? Call your doctor now or seek immediate medical care if: 
· You have yellowing of the skin or the whites of the eyes. (This is called jaundice.) · You have pain in the upper right part of your belly. Watch closely for changes in your health, and be sure to contact your doctor if: 
· You have swelling in your legs or belly. · Your skin itches. Where can you learn more? Go to http://seema-jaymie.info/. Enter L172 in the search box to learn more about \"Nonalcoholic Steatohepatitis (BAKER): Care Instructions. \" Current as of: August 9, 2016 Content Version: 11.3 © 9369-2290 [employer]. Care instructions adapted under license by [employer]utorial[employer] (which disclaims liability or warranty for this information). If you have questions about a medical condition or this instruction, always ask your healthcare professional. [facility] any warranty or liability for your use of this information. Biliary Colic: Care Instructions Your Care Instructions Biliary (say \"BILL-ee-air-ee\") colic is belly pain caused by gallbladder problems. It is usually caused by a gallstone moving through or blocking the common bile duct or cystic duct. Gallstones are stones that form in the gallbladder. They are made of cholesterol and other substances. The gallbladder is a small sac located just under the liver. It stores bile released by the liver. Bile helps you digest fats. Gallstones also can form in the common bile duct or cystic duct. These ducts carry bile from the gallbladder and the liver to the small intestine. Gallstones may be as small as a grain of sand or as large as a golf ball. Gallstones that cause severe symptoms usually are treated with surgery to remove the gallbladder. If the first attack of biliary colic is mild, it is often safe to wait until you have had another attack before you think about having surgery. The doctor has checked you carefully, but problems can develop later. If you notice any problems or new symptoms, get medical treatment right away. Follow-up care is a key part of your treatment and safety. Be sure to make and go to all appointments, and call your doctor if you are having problems. It's also a good idea to know your test results and keep a list of the medicines you take. How can you care for yourself at home? · Take pain medicines exactly as directed. ¨ If the doctor gave you a prescription medicine for pain, take it as prescribed. ¨ If you are not taking a prescription pain medicine, ask your doctor if you can take an over-the-counter medicine. Read and follow all instructions on the label. · Avoid foods that cause symptoms, especially fatty foods. These can cause biliary colic. · You may need more tests to look at your gallbladder. When should you call for help? Call your doctor now or seek immediate medical care if: 
· You have a fever. · You have new belly pain, or your pain gets worse. · There is a new or increasing yellow tint to your skin or the whites of your eyes. · Your urine is dark yellow-brown, or your stools are light-colored or white. · You cannot keep down fluids. Watch closely for changes in your health, and be sure to contact your doctor if: 
· You do not get better as expected. · You are not getting better after 1 day (24 hours). Where can you learn more? Go to http://seema-jaymie.info/. Enter M030 in the search box to learn more about \"Biliary Colic: Care Instructions. \" Current as of: August 9, 2016 Content Version: 11.3 © 9553-8891 [employer]. Care instructions adapted under license by [employer] (which disclaims liability or warranty for this information). If you have questions about a medical condition or this instruction, always ask your healthcare professional. [place] [address] any warranty or liability for your use of this information. Learning About Gallstones What are gallstones? Gallstones are stones that form in the gallbladder. The gallbladder is a small sac located just under the liver. It stores bile released by the liver. Bile helps you digest fats. Gallstones can be smaller than a grain of sand or as large as a golf ball. Gallstones form when cholesterol and other things found in bile make stones. They can also form if the gallbladder doesn't empty as it should. Gallstones can also form in the common bile duct or cystic duct. These tubes carry bile from the gallbladder and the liver to the small intestine. What happens when you have gallstones? Gallstones can cause many different problems, such as: · A blockage in the common bile duct. · Inflammation or infection of the gallbladder (acute cholecystitis). This can happen when a gallstone blocks the cystic duct. · Inflammation or infection of the common bile duct (cholangitis). This can happen when gallstones get stuck in the common bile duct. In rare cases, this can damage the liver or spread infection. · Pancreatitis, an inflammation of the pancreas. What are the symptoms? · Most people who have gallstones don't have symptoms. · Symptoms can include: ¨ Mild pain in the pit of your stomach or in the upper right part of your belly. It may spread to your right upper back or shoulder blade area. ¨ Severe pain that may come and go or be steady. It may get worse when you eat. ¨ Fever and chills, if a gallstone is blocking a bile duct and causing an infection. ¨ Yellowing of your skin and the whites of your eyes. How can you prevent gallstones? There is no sure way to prevent gallstones. But you can reduce your risk of forming gallstones that can cause symptoms. · Stay at a healthy weight. If you need to lose weight, do so slowly and sensibly. · Eat regular, balanced meals. · Be active, and exercise regularly. How are gallstones treated? · If you don't have symptoms, you probably don't need treatment. · For mild symptoms, your doctor may have you take pain medicine and wait to see if the pain goes away. · For severe pain or infection, or if you have more than one gallstone attack, your doctor may suggest surgery to have your gallbladder removed. The body works fine without a gallbladder. Follow-up care is a key part of your treatment and safety. Be sure to make and go to all appointments, and call your doctor if you are having problems. It's also a good idea to know your test results and keep a list of the medicines you take. Where can you learn more? Go to http://seema-jaymie.info/. Enter  in the search box to learn more about \"Learning About Gallstones. \" Current as of: August 9, 2016 Content Version: 11.3 © 0665-2205 [employer]. Care instructions adapted under license by CloudSync (which disclaims liability or warranty for this information). If you have questions about a medical condition or this instruction, always ask your healthcare professional. [address] any warranty or liability for your use of this information. Introducing [facility]! [place] introduces [employer] patient portal. Now you can access parts of your medical record, email your doctor's office, and request medication refills online. 1. In your internet browser, go to [email or web address] 2. Click on the First Time User? Click Here link in the Sign In box. You will see the New Member Sign Up page. 3. Enter your [employer] Access Code exactly as it appears below. You will not need to use this code after youve completed the sign-up process. If you do not sign up before the expiration date, you must request a new code. · [employer] Access Code: [email or web address] Expires: 9/24/2017  9:13 AM 
 
4. Enter the last four digits of your Social Security Number (xxxx) and Date of Birth (mm/dd/yyyy) as indicated and click Submit. You will be taken to the next sign-up page. 5. Create a Shared Performance ID. This will be your Shared Performance login ID and cannot be changed, so think of one that is secure and easy to remember. 6. Create a Shared Performance password. You can change your password at any time. 7. Enter your Password Reset Question and Answer. This can be used at a later time if you forget your password. 8. Enter your e-mail address. You will receive e-mail notification when new information is available in [address] 9. Click Sign Up. You can now view and download portions of your medical record. 10. Click the Download Summary menu link to download a portable copy of your medical information. If you have questions, please visit the Frequently Asked Questions section of the Shared Performance website. Remember, Shared Performance is NOT to be used for urgent needs. For medical emergencies, dial 911. Now available from your iPhone and Android! Please provide this summary of care documentation to your next provider. Your primary care clinician is listed as MANE Soliman. If you have any questions after today's visit, please call [phone].

## 2017-06-26 NOTE — PROGRESS NOTES
1. Have you been to the ER, urgent care clinic since your last visit? Hospitalized since your last visit? No.     2. Have you seen or consulted any other health care providers outside of the [address] [place] [address] since your last visit? Include any pap smears or colon screening. Yes, saw a vascular surgeon on 6/20/2017. Patient presents with follow left LE swelling and to discuss lab results.

## 2017-06-26 NOTE — PROGRESS NOTES
Jona Lesches is a 44 y.o. male and presents with Follow Up Chronic Condition; Swelling (Left LE); and Results (Labs)       Subjective:    LLE- swelling- saw vascular - IVC scan ordered and pending. No changes in swellling. No sob or cp. Gallstone- no pain after eating fatty meals. Reviewed U/S results with pt. Borderline thickening- not likely significant discussed. Elevated trig- no EtOH at this point. Reviewed results with tp. Obesity- exercises some but not counting calories at this point. Assessment/Plan:    Hyperlipidemia- elevated trig and low HDL- discussed diet/wt loss exercise/no EtOH. Holding on tx at this point given data/guidelines. Low 10 year risk for CV dz. Hepatic steatosis- discussed risk of fibrosis/cirrhosis- recheck lft's in 6 months- if continue to be elevated will refer to GI discussed. Gallstones also found but given LFT pattern,unlikley contributing. Obesity- discussed diet and exercise and caloric restriction. Goal of 1-2 lbs/week weight loss. RTC in 6 months. With labs (insurance might be changing and pt states he may have to change physician. Orders Placed This Encounter    METABOLIC PANEL, COMPREHENSIVE     Standing Status:   Future     Standing Expiration Date:   6/27/2018    LIPID PANEL     Standing Status:   Future     Standing Expiration Date:   6/27/2018           ROS:  Negative except as mentioned above  Cardiac- no chest pain or palpitations  Pulmonary- no sob or wheezes  GI- no n/v or diarrhea. SH:  Social History   Substance Use Topics    Smoking status: Current Every Day Smoker    Smokeless tobacco: Never Used      Comment: smokes e cig     Alcohol use No         Medications/Allergies:  Current Outpatient Prescriptions on File Prior to Visit   Medication Sig Dispense Refill    ibuprofen 100 mg tablet Take 100 mg by mouth every six (6) hours as needed for Pain. No current facility-administered medications on file prior to visit. No Known Allergies    Objective:  Visit Vitals    /71    Pulse 75    Temp 97.5 °F (36.4 °C) (Oral)    Resp 17    Ht 6' 2\" (1.88 m)    Wt 264 lb (119.7 kg)    BMI 33.9 kg/m2    Body mass index is 33.9 kg/(m^2). Constitutional: Well developed, nourished, no distress, alert   Eyes: Conjunctiva normal.     CV: S1, S2.  RRR. No murmurs/rubs. No thrills palpated. No carotid bruits. Intact distal pulses. No edema. Pulm: No abnormalities on inspection. Clear to auscultation bilaterally. No wheezing/rhonchi. Normal effort. GI: Soft, nontender, nondistended. Normal active bowel sounds. No  masses on palpation. No hepatosplenomegaly.

## 2017-07-13 LAB
ALPHA-1 ANTITRYPSIN,A1ATR: 125 MG/DL
CERULOPLASMIN,CERUL: 24.4 MG/DL

## 2020-12-03 PROBLEM — R73.9 HYPERGLYCEMIA: Status: ACTIVE | Noted: 2019-12-29

## 2022-03-18 PROBLEM — R60.0 LEG EDEMA, LEFT: Status: ACTIVE | Noted: 2017-05-31

## 2022-03-18 PROBLEM — K76.0 HEPATIC STEATOSIS: Status: ACTIVE | Noted: 2017-06-26

## 2022-03-19 PROBLEM — E66.9 OBESITY (BMI 30-39.9): Status: ACTIVE | Noted: 2017-05-31

## 2022-03-19 PROBLEM — G56.03 BILATERAL CARPAL TUNNEL SYNDROME: Status: ACTIVE | Noted: 2017-03-21

## 2022-03-20 PROBLEM — Z72.0 TOBACCO ABUSE: Status: ACTIVE | Noted: 2017-05-23

## 2022-03-20 PROBLEM — E78.2 MIXED HYPERLIPIDEMIA: Status: ACTIVE | Noted: 2017-05-31

## 2022-03-20 PROBLEM — R73.9 HYPERGLYCEMIA: Status: ACTIVE | Noted: 2019-12-29

## 2025-02-28 ENCOUNTER — HOSPITAL ENCOUNTER (OUTPATIENT)
Facility: HOSPITAL | Age: 45
Setting detail: SPECIMEN
Discharge: HOME | End: 2025-02-28
Payer: COMMERCIAL

## 2025-02-28 ENCOUNTER — OFFICE VISIT (OUTPATIENT)
Facility: CLINIC | Age: 45
End: 2025-02-28

## 2025-02-28 VITALS
TEMPERATURE: 97.8 F | HEART RATE: 114 BPM | BODY MASS INDEX: 31.68 KG/M2 | SYSTOLIC BLOOD PRESSURE: 142 MMHG | WEIGHT: 239 LBS | DIASTOLIC BLOOD PRESSURE: 88 MMHG | HEIGHT: 73 IN | OXYGEN SATURATION: 98 % | RESPIRATION RATE: 18 BRPM

## 2025-02-28 DIAGNOSIS — E11.29 MICROALBUMINURIA DUE TO TYPE 2 DIABETES MELLITUS (HCC): ICD-10-CM

## 2025-02-28 DIAGNOSIS — Z00.00 WELL ADULT EXAM: ICD-10-CM

## 2025-02-28 DIAGNOSIS — R80.9 MICROALBUMINURIA DUE TO TYPE 2 DIABETES MELLITUS (HCC): ICD-10-CM

## 2025-02-28 DIAGNOSIS — E11.65 UNCONTROLLED TYPE 2 DIABETES MELLITUS WITH HYPERGLYCEMIA (HCC): ICD-10-CM

## 2025-02-28 DIAGNOSIS — E11.65 UNCONTROLLED TYPE 2 DIABETES MELLITUS WITH HYPERGLYCEMIA (HCC): Primary | ICD-10-CM

## 2025-02-28 DIAGNOSIS — E78.5 HYPERLIPIDEMIA, UNSPECIFIED HYPERLIPIDEMIA TYPE: ICD-10-CM

## 2025-02-28 DIAGNOSIS — Z12.11 SCREENING FOR COLON CANCER: ICD-10-CM

## 2025-02-28 DIAGNOSIS — Z11.4 ENCOUNTER FOR SCREENING FOR HIV: ICD-10-CM

## 2025-02-28 DIAGNOSIS — I10 PRIMARY HYPERTENSION: ICD-10-CM

## 2025-02-28 LAB
ALBUMIN SERPL-MCNC: 4.3 G/DL (ref 3.4–5)
ALBUMIN/GLOB SERPL: 1.2 (ref 0.8–1.7)
ALP SERPL-CCNC: 120 U/L (ref 45–117)
ALT SERPL-CCNC: 56 U/L (ref 16–61)
ANION GAP SERPL CALC-SCNC: 11 MMOL/L (ref 3–18)
AST SERPL-CCNC: 21 U/L (ref 10–38)
BASOPHILS # BLD: 0.08 K/UL (ref 0–0.1)
BASOPHILS NFR BLD: 0.7 % (ref 0–2)
BILIRUB DIRECT SERPL-MCNC: 0.1 MG/DL (ref 0–0.2)
BILIRUB SERPL-MCNC: 1 MG/DL (ref 0.2–1)
BUN SERPL-MCNC: 18 MG/DL (ref 7–18)
BUN/CREAT SERPL: 18 (ref 12–20)
CALCIUM SERPL-MCNC: 9.6 MG/DL (ref 8.5–10.1)
CHLORIDE SERPL-SCNC: 101 MMOL/L (ref 100–111)
CHOLEST SERPL-MCNC: 251 MG/DL
CO2 SERPL-SCNC: 21 MMOL/L (ref 21–32)
CREAT SERPL-MCNC: 0.98 MG/DL (ref 0.6–1.3)
CREAT UR-MCNC: 96 MG/DL (ref 30–125)
DIFFERENTIAL METHOD BLD: ABNORMAL
EOSINOPHIL # BLD: 0.09 K/UL (ref 0–0.4)
EOSINOPHIL NFR BLD: 0.8 % (ref 0–5)
ERYTHROCYTE [DISTWIDTH] IN BLOOD BY AUTOMATED COUNT: 12.7 % (ref 11.6–14.5)
GLOBULIN SER CALC-MCNC: 3.7 G/DL (ref 2–4)
GLUCOSE SERPL-MCNC: 303 MG/DL (ref 74–99)
HBA1C MFR BLD: 11.4 %
HCT VFR BLD AUTO: 51.6 % (ref 36–48)
HDLC SERPL-MCNC: 48 MG/DL (ref 40–60)
HDLC SERPL: 5.2 (ref 0–5)
HGB BLD-MCNC: 17.7 G/DL (ref 13–16)
IMM GRANULOCYTES # BLD AUTO: 0.09 K/UL (ref 0–0.04)
IMM GRANULOCYTES NFR BLD AUTO: 0.8 % (ref 0–0.5)
LDLC SERPL CALC-MCNC: ABNORMAL MG/DL (ref 0–100)
LIPID PANEL: ABNORMAL
LYMPHOCYTES # BLD: 2.21 K/UL (ref 0.9–3.6)
LYMPHOCYTES NFR BLD: 20.7 % (ref 21–52)
MCH RBC QN AUTO: 30.1 PG (ref 24–34)
MCHC RBC AUTO-ENTMCNC: 34.3 G/DL (ref 31–37)
MCV RBC AUTO: 87.6 FL (ref 78–100)
MICROALBUMIN UR-MCNC: 148 MG/DL (ref 0–3)
MICROALBUMIN/CREAT UR-RTO: 1542 MG/G (ref 0–30)
MONOCYTES # BLD: 0.59 K/UL (ref 0.05–1.2)
MONOCYTES NFR BLD: 5.5 % (ref 3–10)
NEUTS SEG # BLD: 7.61 K/UL (ref 1.8–8)
NEUTS SEG NFR BLD: 71.5 % (ref 40–73)
NRBC # BLD: 0 K/UL (ref 0–0.01)
NRBC BLD-RTO: 0 PER 100 WBC
PLATELET # BLD AUTO: 339 K/UL (ref 135–420)
PMV BLD AUTO: 9.3 FL (ref 9.2–11.8)
POTASSIUM SERPL-SCNC: 4.3 MMOL/L (ref 3.5–5.5)
PROT SERPL-MCNC: 8 G/DL (ref 6.4–8.2)
RBC # BLD AUTO: 5.89 M/UL (ref 4.35–5.65)
SODIUM SERPL-SCNC: 133 MMOL/L (ref 136–145)
TRIGL SERPL-MCNC: 499 MG/DL
VLDLC SERPL CALC-MCNC: ABNORMAL MG/DL
WBC # BLD AUTO: 10.7 K/UL (ref 4.6–13.2)

## 2025-02-28 PROCEDURE — 85025 COMPLETE CBC W/AUTO DIFF WBC: CPT

## 2025-02-28 PROCEDURE — 87389 HIV-1 AG W/HIV-1&-2 AB AG IA: CPT

## 2025-02-28 PROCEDURE — 82043 UR ALBUMIN QUANTITATIVE: CPT

## 2025-02-28 PROCEDURE — 82570 ASSAY OF URINE CREATININE: CPT

## 2025-02-28 PROCEDURE — 80048 BASIC METABOLIC PNL TOTAL CA: CPT

## 2025-02-28 PROCEDURE — 36415 COLL VENOUS BLD VENIPUNCTURE: CPT

## 2025-02-28 PROCEDURE — 80076 HEPATIC FUNCTION PANEL: CPT

## 2025-02-28 PROCEDURE — 80061 LIPID PANEL: CPT

## 2025-02-28 RX ORDER — INSULIN GLARGINE 100 [IU]/ML
15 INJECTION, SOLUTION SUBCUTANEOUS NIGHTLY
Qty: 5 ADJUSTABLE DOSE PRE-FILLED PEN SYRINGE | Refills: 3 | Status: SHIPPED | OUTPATIENT
Start: 2025-02-28 | End: 2025-05-29

## 2025-02-28 RX ORDER — AVOBENZONE, HOMOSALATE, OCTISALATE, OCTOCRYLENE 30; 40; 45; 26 MG/ML; MG/ML; MG/ML; MG/ML
1 CREAM TOPICAL 3 TIMES DAILY
Qty: 200 EACH | Refills: 5 | Status: SHIPPED | OUTPATIENT
Start: 2025-02-28

## 2025-02-28 RX ORDER — ACYCLOVIR 400 MG/1
1 TABLET ORAL DAILY
Qty: 3 EACH | Refills: 5 | Status: SHIPPED | OUTPATIENT
Start: 2025-02-28 | End: 2025-08-27

## 2025-02-28 RX ORDER — BLOOD-GLUCOSE METER
1 KIT MISCELLANEOUS DAILY
Qty: 1 KIT | Refills: 0 | Status: SHIPPED | OUTPATIENT
Start: 2025-02-28

## 2025-02-28 RX ORDER — METFORMIN HYDROCHLORIDE 500 MG/1
1000 TABLET, EXTENDED RELEASE ORAL
Qty: 180 TABLET | Refills: 1 | Status: SHIPPED | OUTPATIENT
Start: 2025-02-28

## 2025-02-28 RX ORDER — GLUCOSAMINE HCL/CHONDROITIN SU 500-400 MG
1 CAPSULE ORAL 3 TIMES DAILY
Qty: 200 STRIP | Refills: 2 | Status: SHIPPED | OUTPATIENT
Start: 2025-02-28

## 2025-02-28 SDOH — ECONOMIC STABILITY: FOOD INSECURITY: WITHIN THE PAST 12 MONTHS, YOU WORRIED THAT YOUR FOOD WOULD RUN OUT BEFORE YOU GOT MONEY TO BUY MORE.: NEVER TRUE

## 2025-02-28 SDOH — ECONOMIC STABILITY: FOOD INSECURITY: WITHIN THE PAST 12 MONTHS, THE FOOD YOU BOUGHT JUST DIDN'T LAST AND YOU DIDN'T HAVE MONEY TO GET MORE.: NEVER TRUE

## 2025-02-28 ASSESSMENT — ENCOUNTER SYMPTOMS
EYE ITCHING: 0
VOICE CHANGE: 0
BACK PAIN: 0
DIARRHEA: 0
CONSTIPATION: 0
EYE DISCHARGE: 0
WHEEZING: 0
NAUSEA: 0
VOMITING: 0
PHOTOPHOBIA: 0
ABDOMINAL PAIN: 0
SORE THROAT: 0
TROUBLE SWALLOWING: 0
SHORTNESS OF BREATH: 0
COUGH: 0
RHINORRHEA: 0

## 2025-02-28 ASSESSMENT — PATIENT HEALTH QUESTIONNAIRE - PHQ9
SUM OF ALL RESPONSES TO PHQ9 QUESTIONS 1 & 2: 3
8. MOVING OR SPEAKING SO SLOWLY THAT OTHER PEOPLE COULD HAVE NOTICED. OR THE OPPOSITE, BEING SO FIGETY OR RESTLESS THAT YOU HAVE BEEN MOVING AROUND A LOT MORE THAN USUAL: NOT AT ALL
2. FEELING DOWN, DEPRESSED OR HOPELESS: NEARLY EVERY DAY
SUM OF ALL RESPONSES TO PHQ QUESTIONS 1-9: 11
SUM OF ALL RESPONSES TO PHQ QUESTIONS 1-9: 11
7. TROUBLE CONCENTRATING ON THINGS, SUCH AS READING THE NEWSPAPER OR WATCHING TELEVISION: NOT AT ALL
SUM OF ALL RESPONSES TO PHQ QUESTIONS 1-9: 11
4. FEELING TIRED OR HAVING LITTLE ENERGY: NEARLY EVERY DAY
6. FEELING BAD ABOUT YOURSELF - OR THAT YOU ARE A FAILURE OR HAVE LET YOURSELF OR YOUR FAMILY DOWN: NOT AT ALL
1. LITTLE INTEREST OR PLEASURE IN DOING THINGS: NOT AT ALL
5. POOR APPETITE OR OVEREATING: MORE THAN HALF THE DAYS
9. THOUGHTS THAT YOU WOULD BE BETTER OFF DEAD, OR OF HURTING YOURSELF: NOT AT ALL
10. IF YOU CHECKED OFF ANY PROBLEMS, HOW DIFFICULT HAVE THESE PROBLEMS MADE IT FOR YOU TO DO YOUR WORK, TAKE CARE OF THINGS AT HOME, OR GET ALONG WITH OTHER PEOPLE: SOMEWHAT DIFFICULT
3. TROUBLE FALLING OR STAYING ASLEEP: NEARLY EVERY DAY
SUM OF ALL RESPONSES TO PHQ QUESTIONS 1-9: 11

## 2025-02-28 NOTE — PROGRESS NOTES
\"Have you been to the ER, urgent care clinic since your last visit?  Hospitalized since your last visit?\"    NO    “Have you seen or consulted any other health care providers outside our system since your last visit?”    NO      “Have you had a colorectal cancer screening such as a colonoscopy/FIT/Cologuard?    NO    No colonoscopy on file  No cologuard on file  No FIT/FOBT on file   No flexible sigmoidoscopy on file       Click Here for Release of Records Request

## 2025-02-28 NOTE — PROGRESS NOTES
dizziness, weakness, light-headedness and headaches.   Psychiatric/Behavioral:  Negative for sleep disturbance. The patient is nervous/anxious.           Objective   Blood pressure (!) 142/88, pulse (!) 114, temperature 97.8 °F (36.6 °C), temperature source Temporal, resp. rate 18, height 1.854 m (6' 1\"), weight 108.4 kg (239 lb), SpO2 98%.  Physical Exam     Physical Exam  Vitals reviewed.   Constitutional:       General: He is not in acute distress.     Appearance: Normal appearance. He is not ill-appearing, toxic-appearing or diaphoretic.   HENT:      Head: Normocephalic and atraumatic.      Right Ear: External ear normal.      Left Ear: External ear normal.   Eyes:      General:         Right eye: No discharge.         Left eye: No discharge.   Cardiovascular:      Rate and Rhythm: Normal rate and regular rhythm.      Pulses: Normal pulses.      Heart sounds: Normal heart sounds. No murmur heard.     No friction rub. No gallop.   Pulmonary:      Effort: Pulmonary effort is normal.      Breath sounds: Normal breath sounds. No wheezing, rhonchi or rales.   Musculoskeletal:         General: Normal range of motion.      Cervical back: Normal range of motion.   Skin:     General: Skin is warm.   Neurological:      Mental Status: He is alert and oriented to person, place, and time. Mental status is at baseline.   Psychiatric:         Mood and Affect: Mood normal.         Behavior: Behavior normal.         On this date 2/28/2025 I have spent 62 minutes reviewing previous notes, test results and face to face with the patient discussing the diagnosis and importance of compliance with the treatment plan as well as documenting on the day of the visit.    This note was dictated utilizing voice recognition software which may lead to typographical errors.  I apologize in advance if the situation occurs.  If questions arise please do not hesitate to contact me or call our department.    The patient (or guardian, if applicable)

## 2025-03-03 ENCOUNTER — TELEPHONE (OUTPATIENT)
Facility: CLINIC | Age: 45
End: 2025-03-03

## 2025-03-03 ENCOUNTER — CLINICAL DOCUMENTATION (OUTPATIENT)
Facility: CLINIC | Age: 45
End: 2025-03-03

## 2025-03-03 LAB
HIV 1+2 AB+HIV1 P24 AG SERPL QL IA: NONREACTIVE
HIV 1/2 RESULT COMMENT: NORMAL

## 2025-03-03 NOTE — TELEPHONE ENCOUNTER
Patient's wife called requesting call back from nurse to discuss medications. States medication (Jardiance) is costing $400 with discount card requesting Generic brand also have question in regards to lawsuit for Jardiance and needs clarification on dosage for Insulin.

## 2025-03-04 NOTE — TELEPHONE ENCOUNTER
I spoke with patient and I informed him that it looks like his insurance prefers mail order. He should call the number on his insurance card and ask what they would charge for his medications. I told him that he can get Lantus with a [employer] card and it would be $35 per month which he would be able to access from the [employer] website. [employer] has a copay card to bring it down to $10 if his insurance covers a decent portion of it. Patient is aware of his options and he stated that he would call the number on his insurance card to see which mail order company his insurance accepts.

## 2025-03-11 ENCOUNTER — OFFICE VISIT (OUTPATIENT)
Facility: CLINIC | Age: 45
End: 2025-03-11
Payer: COMMERCIAL

## 2025-03-11 VITALS
HEART RATE: 83 BPM | TEMPERATURE: 96.9 F | HEIGHT: 73 IN | OXYGEN SATURATION: 98 % | RESPIRATION RATE: 18 BRPM | SYSTOLIC BLOOD PRESSURE: 126 MMHG | BODY MASS INDEX: 30.88 KG/M2 | WEIGHT: 233 LBS | DIASTOLIC BLOOD PRESSURE: 80 MMHG

## 2025-03-11 DIAGNOSIS — E78.2 MIXED HYPERLIPIDEMIA: ICD-10-CM

## 2025-03-11 DIAGNOSIS — E11.29 TYPE 2 DIABETES MELLITUS WITH DIABETIC MICROALBUMINURIA, WITH LONG-TERM CURRENT USE OF INSULIN (HCC): Primary | ICD-10-CM

## 2025-03-11 DIAGNOSIS — Z72.0 TOBACCO ABUSE: ICD-10-CM

## 2025-03-11 DIAGNOSIS — R80.9 TYPE 2 DIABETES MELLITUS WITH DIABETIC MICROALBUMINURIA, WITH LONG-TERM CURRENT USE OF INSULIN (HCC): Primary | ICD-10-CM

## 2025-03-11 DIAGNOSIS — I10 PRIMARY HYPERTENSION: ICD-10-CM

## 2025-03-11 DIAGNOSIS — Z79.4 TYPE 2 DIABETES MELLITUS WITH DIABETIC MICROALBUMINURIA, WITH LONG-TERM CURRENT USE OF INSULIN (HCC): Primary | ICD-10-CM

## 2025-03-11 PROCEDURE — 3079F DIAST BP 80-89 MM HG: CPT | Performed by: STUDENT IN AN ORGANIZED HEALTH CARE EDUCATION/TRAINING PROGRAM

## 2025-03-11 PROCEDURE — 99215 OFFICE O/P EST HI 40 MIN: CPT | Performed by: STUDENT IN AN ORGANIZED HEALTH CARE EDUCATION/TRAINING PROGRAM

## 2025-03-11 PROCEDURE — 3074F SYST BP LT 130 MM HG: CPT | Performed by: STUDENT IN AN ORGANIZED HEALTH CARE EDUCATION/TRAINING PROGRAM

## 2025-03-11 PROCEDURE — 3046F HEMOGLOBIN A1C LEVEL >9.0%: CPT | Performed by: STUDENT IN AN ORGANIZED HEALTH CARE EDUCATION/TRAINING PROGRAM

## 2025-03-11 RX ORDER — HYDROCHLOROTHIAZIDE 12.5 MG/1
CAPSULE ORAL
Qty: 2 EACH | Refills: 5 | Status: SHIPPED | OUTPATIENT
Start: 2025-03-11

## 2025-03-11 RX ORDER — LISINOPRIL 20 MG/1
20 TABLET ORAL DAILY
Qty: 90 TABLET | Refills: 1 | Status: SHIPPED | OUTPATIENT
Start: 2025-03-11 | End: 2025-03-14 | Stop reason: SDUPTHER

## 2025-03-11 RX ORDER — ATORVASTATIN CALCIUM 40 MG/1
40 TABLET, FILM COATED ORAL DAILY
Qty: 90 TABLET | Refills: 1 | Status: SHIPPED | OUTPATIENT
Start: 2025-03-11 | End: 2025-03-14 | Stop reason: SDUPTHER

## 2025-03-11 RX ORDER — DAPAGLIFLOZIN 10 MG/1
10 TABLET, FILM COATED ORAL EVERY MORNING
Qty: 90 TABLET | Refills: 0 | Status: SHIPPED | OUTPATIENT
Start: 2025-03-11 | End: 2025-03-14 | Stop reason: SDUPTHER

## 2025-03-11 ASSESSMENT — ENCOUNTER SYMPTOMS
VOMITING: 0
ABDOMINAL PAIN: 0
NAUSEA: 0
EYE ITCHING: 0
CONSTIPATION: 0
VOICE CHANGE: 0
RHINORRHEA: 0
PHOTOPHOBIA: 0
BACK PAIN: 0
SHORTNESS OF BREATH: 0
DIARRHEA: 0
TROUBLE SWALLOWING: 0
EYE DISCHARGE: 0
COUGH: 0
SORE THROAT: 0
WHEEZING: 0

## 2025-03-11 ASSESSMENT — PATIENT HEALTH QUESTIONNAIRE - PHQ9
SUM OF ALL RESPONSES TO PHQ QUESTIONS 1-9: 0
1. LITTLE INTEREST OR PLEASURE IN DOING THINGS: NOT AT ALL
2. FEELING DOWN, DEPRESSED OR HOPELESS: NOT AT ALL

## 2025-03-11 NOTE — PROGRESS NOTES
\"Have you been to the ER, urgent care clinic since your last visit?  Hospitalized since your last visit?\"    YES - When: approximately 3 days ago.  Where and Why: [facility]; HTN, headache.    “Have you seen or consulted any other health care providers outside our system since your last visit?”    NO      “Have you had a colorectal cancer screening such as a colonoscopy/FIT/Cologuard?    NO    No colonoscopy on file  No cologuard on file  No FIT/FOBT on file   No flexible sigmoidoscopy on file       Click Here for Release of Records Request

## 2025-03-11 NOTE — PATIENT INSTRUCTIONS
heart problems in adults who also have kidney problems caused by type 2 diabetes.  Dapagliflozin may also be used for purposes not listed in this medication guide.  What should I discuss with my healthcare provider before taking dapagliflozin?  You should not use dapagliflozin if you are allergic to it, or if you have:  severe kidney disease (or if you are on dialysis); or  diabetic ketoacidosis (call your doctor for treatment).  Tell your doctor if you have ever had:  polycystic kidney disease;  liver disease;  bladder infections or other urination problems;  problems with your pancreas, including surgery;  alcoholism, or if you currently drink large amounts of alcohol; or  if you are on a low salt diet.  Follow your doctor's instructions about using this medicine if you are pregnant or you become pregnant. Controlling diabetes is very important during pregnancy.  You should not use dapagliflozin during the second or third trimester of pregnancy.   Do not breastfeed.  How should I take dapagliflozin?  Follow all directions on your prescription label and read all medication guides or instruction sheets. Your doctor may occasionally change your dose. Use the medicine exactly as directed.  You may take dapagliflozin with or without food.  Your blood sugar will need to be checked often, and you may also need to test the level of ketones in your urine. Dapagliflozin can cause life-threatening ketoacidosis (too much acid in the blood). Even if your blood sugar is normal, contact your doctor if a urine test shows that you have high ketones in the urine.  Blood sugar can be affected by stress, illness, surgery, exercise, alcohol use, or skipping meals.  Low blood sugar (hypoglycemia) can make you feel very hungry, dizzy, irritable, or shaky. To quickly treat hypoglycemia, eat or drink hard candy, crackers, raisins, fruit juice, or non-diet soda. Your doctor may prescribe glucagon injection in case of severe

## 2025-03-11 NOTE — PROGRESS NOTES
Josue Arita (:  1980) is a 45 y.o. male, Established patient, here for evaluation of the following chief complaint(s):  Follow-up (Patient was seen at [facility] 3/8/2025 for hypertension and headache.) and Other (Patient has c/o a sore on the bottom of his right foot. )         Assessment & Plan  1. Diabetes Mellitus.  His A1c level is currently at 11. He will continue his current regimen of insulin 15 units nightly and metformin 1000 mg twice daily. Additionally, he will start Farxiga 10 mg in the morning. He is advised to monitor his blood glucose levels before breakfast. If his blood glucose levels exceed 150 for two consecutive days, he should increase his insulin dosage by 2 units on the third day. A prescription for Freestyle Zo has been provided, and he is instructed to contact the provider if there are any issues with coverage. An eye examination has been recommended.    2. Hypertension.  He will be initiated on lisinopril 20 mg daily, which also offers renal protection.    3.  HLD.  He will be started on Lipitor 40 mg.Given his age, , and his comorbidities including diabetes, hypertension, hypercholesterolemia, and smoking, his ASCVD risk is approximately 18 percent. His LDL cholesterol level is elevated, necessitating a target reduction to below 70. A repeat lipid panel will be conducted in 1 to 2 months, and he is advised to fast prior to the test. He is encouraged to adopt lifestyle modifications such as a healthy diet and regular exercise once his cholesterol levels are controlled.    4. Migraines.  He reports experiencing severe migraines, which have returned after a period of absence. He has been taking ibuprofen on three different occasions and BC powder without relief. He is advised to monitor his symptoms and report any changes.    Follow-up  The patient will follow up in 1 month.    Results  Laboratory Studies  Cholesterol levels are high. Triglycerides are

## 2025-03-12 DIAGNOSIS — I10 PRIMARY HYPERTENSION: ICD-10-CM

## 2025-03-12 DIAGNOSIS — R80.9 TYPE 2 DIABETES MELLITUS WITH DIABETIC MICROALBUMINURIA, WITH LONG-TERM CURRENT USE OF INSULIN (HCC): ICD-10-CM

## 2025-03-12 DIAGNOSIS — E78.2 MIXED HYPERLIPIDEMIA: ICD-10-CM

## 2025-03-12 DIAGNOSIS — Z79.4 TYPE 2 DIABETES MELLITUS WITH DIABETIC MICROALBUMINURIA, WITH LONG-TERM CURRENT USE OF INSULIN (HCC): ICD-10-CM

## 2025-03-12 DIAGNOSIS — E11.29 TYPE 2 DIABETES MELLITUS WITH DIABETIC MICROALBUMINURIA, WITH LONG-TERM CURRENT USE OF INSULIN (HCC): ICD-10-CM

## 2025-03-12 NOTE — TELEPHONE ENCOUNTER
Pt's spouse called requesting that his atorvastatin (LIPITOR) 40 MG tablet  and   lisinopril (PRINIVIL;ZESTRIL) 20 MG tablet  medications be sent over to a different pharmacy.    Preferred Pharmacy: [facility] [address] [place] [address]  Phone: [phone]

## 2025-03-13 ENCOUNTER — TELEPHONE (OUTPATIENT)
Facility: CLINIC | Age: 45
End: 2025-03-13

## 2025-03-13 DIAGNOSIS — R80.9 TYPE 2 DIABETES MELLITUS WITH DIABETIC MICROALBUMINURIA, WITH LONG-TERM CURRENT USE OF INSULIN (HCC): Primary | ICD-10-CM

## 2025-03-13 DIAGNOSIS — E11.29 TYPE 2 DIABETES MELLITUS WITH DIABETIC MICROALBUMINURIA, WITH LONG-TERM CURRENT USE OF INSULIN (HCC): Primary | ICD-10-CM

## 2025-03-13 DIAGNOSIS — Z79.4 TYPE 2 DIABETES MELLITUS WITH DIABETIC MICROALBUMINURIA, WITH LONG-TERM CURRENT USE OF INSULIN (HCC): Primary | ICD-10-CM

## 2025-03-13 RX ORDER — LISINOPRIL 20 MG/1
20 TABLET ORAL DAILY
Qty: 90 TABLET | Refills: 1 | Status: CANCELLED | OUTPATIENT
Start: 2025-03-13

## 2025-03-13 RX ORDER — METFORMIN HYDROCHLORIDE 500 MG/1
1000 TABLET, EXTENDED RELEASE ORAL 2 TIMES DAILY WITH MEALS
Qty: 360 TABLET | Refills: 1 | Status: SHIPPED | OUTPATIENT
Start: 2025-03-13

## 2025-03-13 NOTE — TELEPHONE ENCOUNTER
Patient's wife called to request if the doctor could send over   metFORMIN (GLUCOPHAGE-XR) 500 MG extended release tablet [[id number]]  for the patient. She stated the metFORMIN (GLUCOPHAGE) 1000 MG tablet has been causing the patient to have stomach cramps and diarrhea.     Preferred Pharmacy:     [id number] - [place], [place] - [address] [phone] -  [phone]  [address] [place] [id number]  Phone: [phone]  Fax: [phone]

## 2025-03-13 NOTE — TELEPHONE ENCOUNTER
Last Visit: 3/11/2025   Next Appointment: 3/28/2025  Previous Refill Encounter: Atorvastatin 3/11/2025 #90 with 1 refills  Previous Refill Encounter: Lisinopril 3/11/2025 #90 with 1 refills    Requested Prescriptions     Pending Prescriptions Disp Refills    atorvastatin (LIPITOR) 40 MG tablet 90 tablet 1     Sig: Take 1 tablet by mouth daily    lisinopril (PRINIVIL;ZESTRIL) 20 MG tablet 90 tablet 1     Sig: Take 1 tablet by mouth daily

## 2025-03-14 DIAGNOSIS — Z79.4 TYPE 2 DIABETES MELLITUS WITH DIABETIC MICROALBUMINURIA, WITH LONG-TERM CURRENT USE OF INSULIN (HCC): ICD-10-CM

## 2025-03-14 DIAGNOSIS — E11.29 TYPE 2 DIABETES MELLITUS WITH DIABETIC MICROALBUMINURIA, WITH LONG-TERM CURRENT USE OF INSULIN (HCC): ICD-10-CM

## 2025-03-14 DIAGNOSIS — E78.2 MIXED HYPERLIPIDEMIA: ICD-10-CM

## 2025-03-14 DIAGNOSIS — R80.9 TYPE 2 DIABETES MELLITUS WITH DIABETIC MICROALBUMINURIA, WITH LONG-TERM CURRENT USE OF INSULIN (HCC): ICD-10-CM

## 2025-03-14 DIAGNOSIS — I10 PRIMARY HYPERTENSION: ICD-10-CM

## 2025-03-14 RX ORDER — DAPAGLIFLOZIN 10 MG/1
10 TABLET, FILM COATED ORAL EVERY MORNING
Qty: 90 TABLET | Refills: 0 | Status: SHIPPED | OUTPATIENT
Start: 2025-03-14

## 2025-03-14 RX ORDER — ATORVASTATIN CALCIUM 40 MG/1
40 TABLET, FILM COATED ORAL DAILY
Qty: 90 TABLET | Refills: 1 | Status: SHIPPED | OUTPATIENT
Start: 2025-03-14

## 2025-03-14 RX ORDER — LISINOPRIL 20 MG/1
20 TABLET ORAL DAILY
Qty: 90 TABLET | Refills: 1 | Status: SHIPPED | OUTPATIENT
Start: 2025-03-14

## 2025-03-14 NOTE — TELEPHONE ENCOUNTER
Pharmacy Change  Last Visit: 3/11/2025   Next Appointment: 3/28/2025  Previous Refill Encounter: 3/11/2025 #90 with 0 refills    Requested Prescriptions     Pending Prescriptions Disp Refills    dapagliflozin (FARXIGA) 10 MG tablet 90 tablet 0     Sig: Take 1 tablet by mouth every morning

## 2025-03-14 NOTE — TELEPHONE ENCOUNTER
Pharmacy Change  Last Visit: 3/11/2025   Next Appointment: 3/28/2025  Previous Refill Encounter: 3/11/2025 #90 with 1 refills    Requested Prescriptions     Pending Prescriptions Disp Refills    atorvastatin (LIPITOR) 40 MG tablet 90 tablet 1     Sig: Take 1 tablet by mouth daily

## 2025-03-24 ENCOUNTER — TELEPHONE (OUTPATIENT)
Facility: CLINIC | Age: 45
End: 2025-03-24

## 2025-03-24 DIAGNOSIS — E11.29 TYPE 2 DIABETES MELLITUS WITH DIABETIC MICROALBUMINURIA, WITH LONG-TERM CURRENT USE OF INSULIN (HCC): ICD-10-CM

## 2025-03-24 DIAGNOSIS — R80.9 TYPE 2 DIABETES MELLITUS WITH DIABETIC MICROALBUMINURIA, WITH LONG-TERM CURRENT USE OF INSULIN (HCC): ICD-10-CM

## 2025-03-24 DIAGNOSIS — Z51.89 FOLLOW-UP MEDICAL CARE REQUESTED BY PATIENT: Primary | ICD-10-CM

## 2025-03-24 DIAGNOSIS — Z79.4 TYPE 2 DIABETES MELLITUS WITH DIABETIC MICROALBUMINURIA, WITH LONG-TERM CURRENT USE OF INSULIN (HCC): ICD-10-CM

## 2025-03-24 RX ORDER — HYDROCHLOROTHIAZIDE 12.5 MG/1
CAPSULE ORAL
Qty: 2 EACH | Refills: 5 | Status: CANCELLED | OUTPATIENT
Start: 2025-03-24

## 2025-03-24 NOTE — TELEPHONE ENCOUNTER
Patients wife requesting referral for ENT     Doctor Alfredo Lang   [address] [address]   Phone Number [phone]  Fax Number [phone]

## 2025-03-24 NOTE — TELEPHONE ENCOUNTER
Pharmacy Change  Last Visit: 2/28/2025   Next Appointment: 3/28/2025  Previous Refill Encounter: 3/11/2025 #2 each with 5 refills  Requested Prescriptions     Pending Prescriptions Disp Refills    Continuous Glucose Sensor (FREESTYLE JEWEL 3 PLUS SENSOR) MISC 2 each 5     Sig: Daily

## 2025-03-24 NOTE — TELEPHONE ENCOUNTER
Patient's wife requesting Sensor be sent to [facility] states [facility] to expensive    Continuous Glucose Sensor (FREESTYLE JEWEL 3 PLUS SENSOR) [facility]   Phone: [phone]   Address: [facility] [place] [address]   Phone: [phone]

## 2025-03-29 DIAGNOSIS — R80.9 TYPE 2 DIABETES MELLITUS WITH DIABETIC MICROALBUMINURIA, WITH LONG-TERM CURRENT USE OF INSULIN (HCC): ICD-10-CM

## 2025-03-29 DIAGNOSIS — Z79.4 TYPE 2 DIABETES MELLITUS WITH DIABETIC MICROALBUMINURIA, WITH LONG-TERM CURRENT USE OF INSULIN (HCC): ICD-10-CM

## 2025-03-29 DIAGNOSIS — E11.29 TYPE 2 DIABETES MELLITUS WITH DIABETIC MICROALBUMINURIA, WITH LONG-TERM CURRENT USE OF INSULIN (HCC): ICD-10-CM

## 2025-03-31 RX ORDER — HYDROCHLOROTHIAZIDE 12.5 MG/1
CAPSULE ORAL
Qty: 2 EACH | Refills: 5 | OUTPATIENT
Start: 2025-03-31

## 2025-03-31 NOTE — TELEPHONE ENCOUNTER
Patient's wife called to get the status of medication refill.     Continuous Glucose Sensor (FREESTYLE JEWEL 3 PLUS SENSOR) [facility]   [facility]   Phone: [phone]   Address: [facility] [address], [place] [address]   Phone: [phone]

## 2025-03-31 NOTE — TELEPHONE ENCOUNTER
Patient's wife called to follow up on referral request. It looks like the referral is currently pending.

## 2025-04-03 ENCOUNTER — OFFICE VISIT (OUTPATIENT)
Facility: CLINIC | Age: 45
End: 2025-04-03
Payer: COMMERCIAL

## 2025-04-03 VITALS
SYSTOLIC BLOOD PRESSURE: 102 MMHG | BODY MASS INDEX: 31.28 KG/M2 | WEIGHT: 236 LBS | OXYGEN SATURATION: 98 % | TEMPERATURE: 97.5 F | HEART RATE: 72 BPM | RESPIRATION RATE: 18 BRPM | HEIGHT: 73 IN | DIASTOLIC BLOOD PRESSURE: 66 MMHG

## 2025-04-03 DIAGNOSIS — Z79.4 TYPE 2 DIABETES MELLITUS WITH DIABETIC MICROALBUMINURIA, WITH LONG-TERM CURRENT USE OF INSULIN (HCC): ICD-10-CM

## 2025-04-03 DIAGNOSIS — Z13.6 SCREENING FOR CARDIOVASCULAR CONDITION: ICD-10-CM

## 2025-04-03 DIAGNOSIS — N52.9 ERECTILE DYSFUNCTION, UNSPECIFIED ERECTILE DYSFUNCTION TYPE: ICD-10-CM

## 2025-04-03 DIAGNOSIS — E11.29 TYPE 2 DIABETES MELLITUS WITH DIABETIC MICROALBUMINURIA, WITH LONG-TERM CURRENT USE OF INSULIN (HCC): ICD-10-CM

## 2025-04-03 DIAGNOSIS — R80.9 TYPE 2 DIABETES MELLITUS WITH DIABETIC MICROALBUMINURIA, WITH LONG-TERM CURRENT USE OF INSULIN (HCC): ICD-10-CM

## 2025-04-03 DIAGNOSIS — Z00.00 ENCOUNTER FOR WELL ADULT EXAM WITHOUT ABNORMAL FINDINGS: Primary | ICD-10-CM

## 2025-04-03 PROCEDURE — 99396 PREV VISIT EST AGE 40-64: CPT | Performed by: STUDENT IN AN ORGANIZED HEALTH CARE EDUCATION/TRAINING PROGRAM

## 2025-04-03 PROCEDURE — G0446 INTENS BEHAVE THER CARDIO DX: HCPCS | Performed by: STUDENT IN AN ORGANIZED HEALTH CARE EDUCATION/TRAINING PROGRAM

## 2025-04-03 PROCEDURE — 3078F DIAST BP <80 MM HG: CPT | Performed by: STUDENT IN AN ORGANIZED HEALTH CARE EDUCATION/TRAINING PROGRAM

## 2025-04-03 PROCEDURE — 3074F SYST BP LT 130 MM HG: CPT | Performed by: STUDENT IN AN ORGANIZED HEALTH CARE EDUCATION/TRAINING PROGRAM

## 2025-04-03 PROCEDURE — 99214 OFFICE O/P EST MOD 30 MIN: CPT | Performed by: STUDENT IN AN ORGANIZED HEALTH CARE EDUCATION/TRAINING PROGRAM

## 2025-04-03 RX ORDER — HYDROCHLOROTHIAZIDE 12.5 MG/1
CAPSULE ORAL
Qty: 2 EACH | Refills: 5 | Status: SHIPPED | OUTPATIENT
Start: 2025-04-03

## 2025-04-03 RX ORDER — SILDENAFIL 50 MG/1
50 TABLET, FILM COATED ORAL DAILY PRN
Qty: 10 TABLET | Refills: 5 | Status: SHIPPED | OUTPATIENT
Start: 2025-04-03

## 2025-04-03 RX ORDER — HYDROCHLOROTHIAZIDE 12.5 MG/1
CAPSULE ORAL
Qty: 1 EACH | Refills: 0 | Status: SHIPPED | OUTPATIENT
Start: 2025-04-03

## 2025-04-03 RX ORDER — SILDENAFIL 50 MG/1
50 TABLET, FILM COATED ORAL DAILY PRN
Qty: 10 TABLET | Refills: 5 | Status: SHIPPED | OUTPATIENT
Start: 2025-04-03 | End: 2025-04-03

## 2025-04-03 ASSESSMENT — ENCOUNTER SYMPTOMS
NAUSEA: 0
DIARRHEA: 0
SORE THROAT: 0
VOMITING: 0
COUGH: 0
CONSTIPATION: 0
TROUBLE SWALLOWING: 0
SHORTNESS OF BREATH: 0
ABDOMINAL PAIN: 0
RHINORRHEA: 0
BACK PAIN: 0
EYE DISCHARGE: 0
VOICE CHANGE: 0
PHOTOPHOBIA: 0
EYE ITCHING: 0
WHEEZING: 0

## 2025-04-03 ASSESSMENT — PATIENT HEALTH QUESTIONNAIRE - PHQ9
SUM OF ALL RESPONSES TO PHQ QUESTIONS 1-9: 0
2. FEELING DOWN, DEPRESSED OR HOPELESS: NOT AT ALL
SUM OF ALL RESPONSES TO PHQ QUESTIONS 1-9: 0
1. LITTLE INTEREST OR PLEASURE IN DOING THINGS: NOT AT ALL

## 2025-04-03 NOTE — PROGRESS NOTES
\"Have you been to the ER, urgent care clinic since your last visit?  Hospitalized since your last visit?\"    NO    “Have you seen or consulted any other health care providers outside our system since your last visit?”    NO      “Have you had a colorectal cancer screening such as a colonoscopy/FIT/Cologuard?    NO    No colonoscopy on file  No cologuard on file  No FIT/FOBT on file   No flexible sigmoidoscopy on file     “Have you had a diabetic eye exam?”    NO     No diabetic eye exam on file       Click Here for Release of Records Request

## 2025-04-03 NOTE — PATIENT INSTRUCTIONS
diets include foods that are high in protein and fat.  Why might you follow a low-carb diet?  Low-carb diets may be used for a variety of reasons, such as for weight loss. People who have diabetes may use a low-carb diet to help manage their blood sugar levels.  What should you do before you start the diet?  Talk to your doctor before you try any diet. This is even more important if you have health problems like kidney disease, heart disease, or diabetes. Your doctor may suggest that you meet with a registered dietitian. A dietitian can help you make an eating plan that works for you.  What foods do you eat on a low-carb diet?  On a low-carb diet, you choose foods that are high in protein and fat. Examples of these are:  Meat, poultry, and fish.  Eggs.  Nuts, such as walnuts, pecans, almonds, and peanuts.  Peanut butter and other nut butters.  Tofu.  Avocado.  Olives.  Non-starchy vegetables like broccoli, cauliflower, green beans, mushrooms, peppers, lettuce, and spinach.  Unsweetened non-dairy milks like almond milk and coconut milk.  Cheese, cottage cheese, and cream cheese.  Where can you learn more?  Go to https://www.ENDYMION.net/patientEd and enter C335 to learn more about \"Learning About Low-Carbohydrate Diets.\"  Current as of: October 7, 2024  Content Version: 14.4  © 9247-1252 [employer].   Care instructions adapted under license by [employer]Â®. If you have questions about a medical condition or this instruction, always ask your healthcare professional. [employer], [employer], disclaims any warranty or liability for your use of this information.         Eating Healthy Foods: Care Instructions  With every meal, you can make healthy food choices. Try to eat a variety of fruits, vegetables, whole grains, lean proteins, and low-fat dairy products. This can help you get the right balance of nutrients, including vitamins and minerals. Small changes add up over time. You can start by adding one

## 2025-04-03 NOTE — PROGRESS NOTES
Well Adult Note  Name: Josue Arita Today’s Date: 4/3/2025   MRN: [id number] Sex: Male   Age: 45 y.o. Ethnicity: Non- / Non    : 1980 Race: Other      Josue Arita is here for a well adult exam.          Assessment & Plan  1. Diabetes Mellitus.  - His last recorded A1c level was above 11, with a target goal of <7.  - He has been advised to maintain a consistent diet and medication regmen, he will continue glargine 25 units nightly, metformin 1000 mg twice daily and Farxiga 10 mg daily.  - A prescription for Freestyle Zo sensors will be provided and sent to [facility]. He is instructed to monitor his blood glucose levels before breakfast and two hours postprandial in the evening.  - If his morning blood glucose levels exceed 150 for two consecutive days, he is advised to increase his insulin dosage from 25 units to 30 units and inform the provider. A repeat A1c test will be conducted during his next visit.    2. Erectile Dysfunction.  - He has been informed that diabetes can contribute to erectile dysfunction.  - A prescription for sildenafil 50 mg will be provided and sent to [facility] pharmacy.  - He has been advised to seek immediate medical attention at the emergency room if he experiences an erection lasting more than four hours.  - He has previously used similar medications without issues.    3. Sinus Infection.  - He reports recurrent sinus infections leading to severe headaches and migraines.  - Physical examination reveals no current ear pain or infection.  - A referral to an ENT specialist will be made for further evaluation.  - He has been advised to use a neti pot for symptomatic relief but reports limited effectiveness.    Follow-up  The patient will follow up in 1 month.    Encounter for well adult exam without abnormal findings  45 y.o.  gentleman presenting today for CPE and follow-up of his diabetes and ED.  Patient at this time denies any acute medical

## 2025-04-07 ENCOUNTER — TELEMEDICINE (OUTPATIENT)
Facility: CLINIC | Age: 45
End: 2025-04-07
Payer: COMMERCIAL

## 2025-04-07 DIAGNOSIS — J06.9 URI WITH COUGH AND CONGESTION: Primary | ICD-10-CM

## 2025-04-07 PROCEDURE — 99213 OFFICE O/P EST LOW 20 MIN: CPT | Performed by: STUDENT IN AN ORGANIZED HEALTH CARE EDUCATION/TRAINING PROGRAM

## 2025-04-07 ASSESSMENT — ENCOUNTER SYMPTOMS
SHORTNESS OF BREATH: 0
CONSTIPATION: 0
PHOTOPHOBIA: 0
RHINORRHEA: 0
COUGH: 1
BACK PAIN: 0
VOICE CHANGE: 0
EYE DISCHARGE: 0
DIARRHEA: 0
EYE ITCHING: 0
SINUS PRESSURE: 1
SORE THROAT: 0
VOMITING: 0
WHEEZING: 0
TROUBLE SWALLOWING: 0
NAUSEA: 0
ABDOMINAL PAIN: 0

## 2025-04-07 ASSESSMENT — PATIENT HEALTH QUESTIONNAIRE - PHQ9
SUM OF ALL RESPONSES TO PHQ QUESTIONS 1-9: 0
1. LITTLE INTEREST OR PLEASURE IN DOING THINGS: NOT AT ALL
2. FEELING DOWN, DEPRESSED OR HOPELESS: NOT AT ALL
SUM OF ALL RESPONSES TO PHQ QUESTIONS 1-9: 0

## 2025-04-07 NOTE — PROGRESS NOTES
Josue Arita, was evaluated through a synchronous (real-time) audio-video encounter. The patient (or guardian if applicable) is aware that this is a billable service, which includes applicable co-pays. This Virtual Visit was conducted with patient's (and/or legal guardian's) consent. Patient identification was verified, and a caregiver was present when appropriate.   The patient was located at Home: [address] [facility] [address]  Provider was located at Facility (Appt Dept): [address], [facility] [address]  [place] [address]  Confirm you are appropriately licensed, registered, or certified to deliver care in the state where the patient is located as indicated above. If you are not or unsure, please re-schedule the visit: Yes, I confirm.     Josue Arita (:  1980) is a Established patient, presenting virtually for evaluation of the following:      Below is the assessment and plan developed based on review of pertinent history, physical exam, labs, studies, and medications.     Assessment & Plan  URI with cough and congestion   Pleasant 45 y.o.  male presenting today for evaluation via telemedicine with complaints of cough and congestion likely 2/2 viral URI.  Counseled Patient on natural course of viral illness.  Counseled patient on use of Tylenol alternating ibuprofen for symptomatic relief of discomfort of fever  Counseled patient on use of honey, and cough drops for symptomatic relief.  Counseled patient to ensure adequate hydration, and to call back or go to the ED if symptoms suddenly deteriorates.  Patient reports improvement, denies any fevers in the last 48 hours without use of any antipyretics           No follow-ups on file.       Subjective   History of Present Illness  The patient presents via virtual visit for evaluation of suspected influenza.    Symptoms began last Friday with difficulty breathing, excessive coughing, and joint pain. A fever of 103.5 degrees was noted,

## 2025-04-14 ENCOUNTER — TELEPHONE (OUTPATIENT)
Facility: CLINIC | Age: 45
End: 2025-04-14

## 2025-04-14 NOTE — TELEPHONE ENCOUNTER
Patient says his medication Metformin has been causing diarrhea and he no longer want to continue taking it. He is requesting if he can be prescribed another medication. Patient is requesting call back at [phone].

## 2025-04-15 NOTE — TELEPHONE ENCOUNTER
Left patient a vm to contact the office or message provider back on Mychart regarding new medication.

## 2025-04-24 RX ORDER — ATORVASTATIN CALCIUM 40 MG/1
40 TABLET, FILM COATED ORAL DAILY
Qty: 90 TABLET | Refills: 1 | OUTPATIENT
Start: 2025-04-24

## 2025-07-24 LAB
ESTIMATED AVERAGE GLUCOSE: 167
HBA1C MFR BLD: 7.4 %

## 2025-07-25 ENCOUNTER — TELEPHONE (OUTPATIENT)
Facility: CLINIC | Age: 45
End: 2025-07-25

## 2025-07-25 NOTE — TELEPHONE ENCOUNTER
----- Message from Jetximena PALMA sent at 7/25/2025  3:36 PM EDT -----  Regarding: [facility] Appointment Request  [facility] Appointment Request    Patient needs appointment for [facility] Appointment Type: Hospital Follow Up.    Patient Requested Dates(s):Wednesday or Thursday   Patient Requested Time:Early morning or Latest   Provider Name:TaishaMarvel DO    Reason for Appointment Request: Other Unable to reach the practice   --------------------------------------------------------------------------------------------------------------------------    Relationship to Patient: Covered Entity [employer]     Call Back Information: OK to leave message on voicemail  Preferred Call Back Number: Phone [phone]

## 2025-08-01 ENCOUNTER — OFFICE VISIT (OUTPATIENT)
Facility: CLINIC | Age: 45
End: 2025-08-01
Payer: COMMERCIAL

## 2025-08-01 VITALS
HEIGHT: 73 IN | RESPIRATION RATE: 18 BRPM | DIASTOLIC BLOOD PRESSURE: 80 MMHG | TEMPERATURE: 97 F | HEART RATE: 73 BPM | SYSTOLIC BLOOD PRESSURE: 129 MMHG | OXYGEN SATURATION: 98 % | WEIGHT: 229 LBS | BODY MASS INDEX: 30.35 KG/M2

## 2025-08-01 DIAGNOSIS — I10 PRIMARY HYPERTENSION: ICD-10-CM

## 2025-08-01 DIAGNOSIS — Z79.4 TYPE 2 DIABETES MELLITUS WITH DIABETIC MICROALBUMINURIA, WITH LONG-TERM CURRENT USE OF INSULIN (HCC): Primary | ICD-10-CM

## 2025-08-01 DIAGNOSIS — R80.9 TYPE 2 DIABETES MELLITUS WITH DIABETIC MICROALBUMINURIA, WITH LONG-TERM CURRENT USE OF INSULIN (HCC): Primary | ICD-10-CM

## 2025-08-01 DIAGNOSIS — Z09 HOSPITAL DISCHARGE FOLLOW-UP: ICD-10-CM

## 2025-08-01 DIAGNOSIS — N52.9 ERECTILE DYSFUNCTION, UNSPECIFIED ERECTILE DYSFUNCTION TYPE: ICD-10-CM

## 2025-08-01 DIAGNOSIS — E11.29 TYPE 2 DIABETES MELLITUS WITH DIABETIC MICROALBUMINURIA, WITH LONG-TERM CURRENT USE OF INSULIN (HCC): Primary | ICD-10-CM

## 2025-08-01 DIAGNOSIS — Z76.89 ENCOUNTER FOR SUPPORT AND COORDINATION OF TRANSITION OF CARE: ICD-10-CM

## 2025-08-01 PROCEDURE — 3046F HEMOGLOBIN A1C LEVEL >9.0%: CPT | Performed by: STUDENT IN AN ORGANIZED HEALTH CARE EDUCATION/TRAINING PROGRAM

## 2025-08-01 PROCEDURE — 1111F DSCHRG MED/CURRENT MED MERGE: CPT | Performed by: STUDENT IN AN ORGANIZED HEALTH CARE EDUCATION/TRAINING PROGRAM

## 2025-08-01 PROCEDURE — 3074F SYST BP LT 130 MM HG: CPT | Performed by: STUDENT IN AN ORGANIZED HEALTH CARE EDUCATION/TRAINING PROGRAM

## 2025-08-01 PROCEDURE — 3079F DIAST BP 80-89 MM HG: CPT | Performed by: STUDENT IN AN ORGANIZED HEALTH CARE EDUCATION/TRAINING PROGRAM

## 2025-08-01 PROCEDURE — 99214 OFFICE O/P EST MOD 30 MIN: CPT | Performed by: STUDENT IN AN ORGANIZED HEALTH CARE EDUCATION/TRAINING PROGRAM

## 2025-08-01 RX ORDER — DAPAGLIFLOZIN 10 MG/1
10 TABLET, FILM COATED ORAL EVERY MORNING
Qty: 30 TABLET | Refills: 2 | Status: SHIPPED | OUTPATIENT
Start: 2025-08-01

## 2025-08-01 ASSESSMENT — PATIENT HEALTH QUESTIONNAIRE - PHQ9
SUM OF ALL RESPONSES TO PHQ QUESTIONS 1-9: 0
2. FEELING DOWN, DEPRESSED OR HOPELESS: NOT AT ALL
SUM OF ALL RESPONSES TO PHQ QUESTIONS 1-9: 0
SUM OF ALL RESPONSES TO PHQ QUESTIONS 1-9: 0
1. LITTLE INTEREST OR PLEASURE IN DOING THINGS: NOT AT ALL
SUM OF ALL RESPONSES TO PHQ QUESTIONS 1-9: 0

## 2025-08-01 ASSESSMENT — ENCOUNTER SYMPTOMS
ABDOMINAL PAIN: 0
VOMITING: 0
VOICE CHANGE: 0
EYE DISCHARGE: 0
CONSTIPATION: 0
BACK PAIN: 0
RHINORRHEA: 0
DIARRHEA: 0
SHORTNESS OF BREATH: 0
EYE ITCHING: 0
SORE THROAT: 0
NAUSEA: 0
PHOTOPHOBIA: 0
WHEEZING: 0
TROUBLE SWALLOWING: 0
COUGH: 0

## 2025-08-01 NOTE — PROGRESS NOTES
Post-Discharge Transitional Care Follow Up    Josue Arita   YOB: 1980    Date of Office Visit:  8/1/2025  Date of Hospital Admission: 7/23/2025  Date of Hospital Discharge: 7/25/2025    Care management risk score Rising risk (score 2-5) and Complex Care (Scores >=6): No Risk Score On File     Non face to face  following discharge, date last encounter closed (first attempt may have been earlier): *No documented post hospital discharge outreach found in the last 14 days     Call initiated 2 business days of discharge: *No response recorded in the last 14 days  Josue ANDRE was seen today for follow-up from hospital and [facility].    Diagnoses and all orders for this visit:    Type 2 diabetes mellitus with diabetic microalbuminuria, with long-term current use of insulin (HCC)  -     dapagliflozin (FARXIGA) 10 MG tablet; Take 1 tablet by mouth every morning    Primary hypertension    Erectile dysfunction, unspecified erectile dysfunction type    Hospital discharge follow-up  -     MO DISCHARGE MEDS RECONCILED W/ CURRENT OUTPATIENT MED LIST    Encounter for support and coordination of transition of care        ASSESSMENT/PLAN:   Assessment & Plan  1. Cellulitis  - Recent episode in the leg after exposure to water while wearing flip-flops  - Treated with antibiotics in the ER; infection has improved  - No current antibiotics needed; no further growth from blood cultures  - Foot is doing well    2. Diabetes Mellitus  - A1c has improved to 7.4; further reduction desired  - Currently on insulin 30 units daily and lisinopril  - Start Farxiga (dapagliflozin) to help manage diabetes and protect kidney function  - Repeat urine microalbumin test in 3 months; referral to nephrology if kidney function does not improve    3. Hypertension  - Blood pressure well-controlled at 129/80 mmHg  - Continue current regimen of lisinopril 20 mg daily    4. Erectile Dysfunction  - He has been informed that diabetes can

## 2025-08-01 NOTE — PROGRESS NOTES
\"Have you been to the ER, urgent care clinic since your last visit?  Hospitalized since your last visit?\"    YES - When: approximately 9 days ago.  Where and Why: [facility]; cellulitis.    “Have you seen or consulted any other health care providers outside our system since your last visit?”    NO      “Have you had a colorectal cancer screening such as a colonoscopy/FIT/Cologuard?    NO    No colonoscopy on file  No cologuard on file  No FIT/FOBT on file   No flexible sigmoidoscopy on file     “Have you had a diabetic eye exam?”    NO     No diabetic eye exam on file       Click Here for Release of Records Request